# Patient Record
Sex: FEMALE | Race: WHITE | NOT HISPANIC OR LATINO | Employment: FULL TIME | URBAN - METROPOLITAN AREA
[De-identification: names, ages, dates, MRNs, and addresses within clinical notes are randomized per-mention and may not be internally consistent; named-entity substitution may affect disease eponyms.]

---

## 2018-01-16 NOTE — PROGRESS NOTES
Assessment    1  Anxiety disorder (300 00) (F41 9)    Plan  Allergic rhinitis    · Benzonatate 100 MG Oral Capsule  Anxiety disorder    · ALPRAZolam 0 25 MG Oral Tablet; take 1 tablet daily as needed  Conjunctivitis    · Polymyxin B-Trimethoprim 59221-4 1 UNIT/ML-% Ophthalmic Solution  Hand, foot and mouth disease    · Tylenol 325 MG Oral Tablet  PMH: Influenza vaccination administered at current visit    · Citalopram Hydrobromide 10 MG Oral Tablet; Take 1 tablet daily   · Follow-up visit in 1 month Evaluation and Treatment  Follow-up in March  Status: Hold  For - Scheduling  Requested for: 64BLH5774   · Fluzone Quadrivalent Intramuscular Suspension    History of Present Illness  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 [place]  This is a new poisition  She was treated with citalopram a few years ago and had good results  Active Problems    1  Acute viral pharyngitis (462) (J02 9)   2  Adjustment disorder with anxiety (309 24) (F43 22)   3  Allergic rhinitis (477 9) (J30 9)   4  Allergic rhinitis due to pollen (477 0) (J30 1)   5  Anxiety disorder (300 00) (F41 9)   6  Colon cancer screening (V76 51) (Z12 11)   7  Conjunctivitis (372 30) (H10 9)   8  Dysphagia (787 20) (R13 10)   9  Encounter for routine gynecological examination with Papanicolaou smear of cervix   (V72 31,V76 2) (Z01 419,Z12 4)   10  Hand, foot and mouth disease (074 3) (B08 4)   11  Screening for HPV (human papillomavirus) (V73 81) (Z11 51)   12  Toe laceration (893 0) (S91 119A)   13  Ulcerative colitis without complications (345 7) (E43 93)   14  Vertigo (780 4) (R42)    Past Medical History    1  History of Denial Of Any Significant Medical History   2  History of headache (V13 89) (Z87 898)   3  History of Influenza vaccination administered at current visit (V04 81) (Z23)   4  History of Irritable bowel syndrome (564 1) (K58 9)   5  History of Rheumatoid arthritis (714 0) (M06 9)   6   History of Urinary tract infection (599 0) (N39 0)    Surgical History    1  History of  Section   2  History of  Section   3  History of Removal Of Lesion    Family History    1  Family history of Anemia   2  Family history of Diabetes   3  Family history of Diabetes Mellitus (V18 0)   4  Family history of Family Health Status Of Mother - Alive   5  Family history of Prostate cancer   6  Family history of Skin cancer    7  Family history of Family Health Status Of Father - Alive   8  Family history of Prostate cancer   9  Family history of Prostate Cancer (V16 42)   10  Family history of Skin cancer    11  Family history of High blood pressure    12  Family history of Anemia   13  Family history of Diabetes   14  Family history of Peptic ulcer   15  Family history of Prostate cancer   16  Family history of Skin cancer   16  Family history of UTI (urinary tract infection)    25  Family history of Arthritis (V17 7)   19  Family history of Breast Cancer (V16 3)   20  Family history of Headache   21  Family history of Heart Disease (V17 49)   22  Family history of Irritable Bowel Syndrome   23  Family history of Lung Cancer (V16 1)   24  Family history of Osteoporosis (V17 81)    Social History    · Being A Social Drinker   · Caffeine Use   · Daily Coffee Consumption (___ Cups/Day)   · Exercise Frequency (Times/Week)   · Never a smoker   · History of Never a smoker   · Sexually Active   · Denied: History of Uses drugs daily    Current Meds   1  ALPRAZolam 0 25 MG Oral Tablet; take 1 tablet daily as needed; Therapy: 13QSR8505 to (Last Rx:23Nml6530) Ordered   2  Benzonatate 100 MG Oral Capsule; take 1 casule every 6- 8 hours during daytime for   cough; Therapy: 26KOA2021 to (Last Rx:2015)  Requested for: 2015 Ordered   3  Biotin TABS; Therapy: (Recorded:2015) to Recorded   4  Lialda 1 2 GM Oral Tablet Delayed Release; 2 tablets daily in morning Recorded   5  Multivitamins Oral Capsule Recorded   6   Polymyxin B-Trimethoprim 35655-6 1 UNIT/ML-% Ophthalmic Solution; INSTILL 1 DROP   IN BOTH EYES EVERY 3 HOURS DAILY; Therapy: 90SMC7694 to (Kathleen Salk)  Requested for: 38SXL4417; Last   Rx:22Nov2015 Ordered   7  Tylenol 325 MG Oral Tablet; TAKE 1 TO 2 TABLETS EVERY 6 HOURS AS NEEDED; Therapy: 54DLD0450 to (Last Rx:97Yde3384)  Requested for: 99Fnc1988 Ordered    Allergies    1  No Known Drug Allergies    Vitals  Vital Signs [Data Includes: Current Encounter]    Recorded: 18HHD2406 03:14PM   Heart Rate 80   Respiration 18   Systolic 780   Diastolic 72   Height 5 ft 7 in   Weight 128 lb 4 00 oz   BMI Calculated 20 09   BSA Calculated 1 67     Physical Exam    Constitutional   General appearance: No acute distress, well appearing and well nourished           Results/Data  Encounter Results   (1) THIN PREP PAP WITH IMAGING 47SCO9184 12:00AM Earmon Kil     Test Name Result Flag Reference   THIN PREP PAP W  IMAG COMMENT see attached       Summary / No summary entered :      No summary entered  Documents attached :      sPap Tests - [facility]; Enc: [id number] - Appointment - Estevan Hampton -      (Obstetrics/Gynecology) (Additional Information Document)    Signatures   Electronically signed by : MICHELLE Live ; Feb 2 2016  3:47PM EST                       (Author)

## 2018-01-30 ENCOUNTER — OFFICE VISIT (OUTPATIENT)
Dept: OBGYN CLINIC | Facility: MEDICAL CENTER | Age: 43
End: 2018-01-30
Payer: COMMERCIAL

## 2018-01-30 VITALS
DIASTOLIC BLOOD PRESSURE: 72 MMHG | WEIGHT: 138 LBS | HEIGHT: 67 IN | BODY MASS INDEX: 21.66 KG/M2 | SYSTOLIC BLOOD PRESSURE: 128 MMHG

## 2018-01-30 DIAGNOSIS — Z01.419 ENCOUNTER FOR GYNECOLOGICAL EXAMINATION (GENERAL) (ROUTINE) WITHOUT ABNORMAL FINDINGS: Primary | ICD-10-CM

## 2018-01-30 DIAGNOSIS — Z98.890 HISTORY OF RIGHT BREAST BIOPSY: ICD-10-CM

## 2018-01-30 DIAGNOSIS — Z11.51 SCREENING FOR HPV (HUMAN PAPILLOMAVIRUS): ICD-10-CM

## 2018-01-30 PROCEDURE — 87624 HPV HI-RISK TYP POOLED RSLT: CPT | Performed by: PHYSICIAN ASSISTANT

## 2018-01-30 PROCEDURE — S0612 ANNUAL GYNECOLOGICAL EXAMINA: HCPCS | Performed by: PHYSICIAN ASSISTANT

## 2018-01-30 PROCEDURE — G0145 SCR C/V CYTO,THINLAYER,RESCR: HCPCS | Performed by: PHYSICIAN ASSISTANT

## 2018-01-30 RX ORDER — MESALAMINE 1.2 G/1
TABLET, DELAYED RELEASE ORAL
COMMUNITY
End: 2020-11-06

## 2018-01-30 RX ORDER — ALPRAZOLAM 0.25 MG/1
TABLET ORAL
COMMUNITY
End: 2020-11-06

## 2018-01-30 NOTE — PROGRESS NOTES
Assessment/Plan  Problem List Items Addressed This Visit     Encounter for gynecological examination (general) (routine) without abnormal findings - Primary     Annual exam and pap smear with HPV performed, will call if abnormal   Per patient she is due for 6 month mammo 2018 on R, will get prior reports from [place] sent here, rx given to patient  Patient wishes to try to conceive, discussed AMA and increased risks, recommend starting PNV now and if wants to conceive not to delay decision to start trying  RTO 1 year for next annual          Relevant Orders    Liquid-based pap, screening      Other Visit Diagnoses     Screening for HPV (human papillomavirus)        Relevant Orders    Liquid-based pap, screening    History of right breast biopsy        Relevant Orders    Mammo diagnostic right w 3d & cad          Subjective      Nicole Richards is a 40 y o  female who presents for a new patient annual GYN exam  Periods are regular every 28-30 days, Dysmenorrhea:mild, occurring premenstrually  She denies intermenstrual bleeding, spotting, or discharge  She reports that she is sexually active with 1 partner and using condoms for contraception  Last Pap smear: 2015  Regular self breast exam: no  Last mammogram: 2017  Family history of uterine or ovarian cancer: no  Family history of breast cancer: no  Family history of colon cancer: no    Menstrual History:  OB History      Para Term  AB Living    2 0 0 0 0 2    SAB TAB Ectopic Multiple Live Births    0 0 0 0 2        Patient's last menstrual period was 2017 (exact date)  The following portions of the patient's history were reviewed and updated as appropriate: allergies, current medications, past family history, past medical history, past social history, past surgical history and problem list     Review of Systems  Review of Systems   Constitutional: Negative for chills and fever  Respiratory: Negative for shortness of breath  Cardiovascular: Negative for chest pain  Gastrointestinal: Negative for abdominal pain  Genitourinary: Negative for dysuria, pelvic pain, vaginal discharge and vaginal pain  Negative for Breast Pain and Breast Lump  Negative for Urinary Incontinence  Neurological: Negative for headaches  Objective      /72 (BP Location: Right arm, Patient Position: Sitting, Cuff Size: Standard)   Ht 5' 7" (1 702 m)   Wt 62 6 kg (138 lb)   LMP 12/28/2017 (Exact Date)   BMI 21 61 kg/m²     Physical Exam   Constitutional: She is oriented to person, place, and time  She appears well-developed and well-nourished  Neck: No thyromegaly present  Cardiovascular: Normal rate and regular rhythm  Pulmonary/Chest: Effort normal and breath sounds normal  Right breast exhibits no mass, no nipple discharge, no skin change and no tenderness  Left breast exhibits no mass, no nipple discharge, no skin change and no tenderness  Abdominal: Soft  There is no tenderness  There is no rebound and no guarding  Genitourinary: There is no rash or lesion on the right labia  There is no rash or lesion on the left labia  Uterus is not enlarged and not tender  Cervix exhibits no motion tenderness and no discharge  Right adnexum displays no mass, no tenderness and no fullness  Left adnexum displays no mass and no tenderness  No bleeding in the vagina  No vaginal discharge found  Genitourinary Comments: Pap performed   Neurological: She is alert and oriented to person, place, and time  Psychiatric: She has a normal mood and affect

## 2018-01-30 NOTE — ASSESSMENT & PLAN NOTE
Annual exam and pap smear with HPV performed, will call if abnormal   Per patient she is due for 6 month mammo 5/2018 on R, will get prior reports from [place] sent here, rx given to patient  Patient wishes to try to conceive, discussed AMA and increased risks, recommend starting PNV now and if wants to conceive not to delay decision to start trying    RTO 1 year for next annual

## 2018-02-06 LAB — HPV RRNA GENITAL QL NAA+PROBE: NORMAL

## 2018-02-07 ENCOUNTER — OFFICE VISIT (OUTPATIENT)
Dept: URGENT CARE | Facility: MEDICAL CENTER | Age: 43
End: 2018-02-07
Payer: COMMERCIAL

## 2018-02-07 VITALS
SYSTOLIC BLOOD PRESSURE: 114 MMHG | RESPIRATION RATE: 18 BRPM | HEART RATE: 104 BPM | BODY MASS INDEX: 21.5 KG/M2 | OXYGEN SATURATION: 97 % | HEIGHT: 67 IN | DIASTOLIC BLOOD PRESSURE: 70 MMHG | TEMPERATURE: 100.9 F | WEIGHT: 137 LBS

## 2018-02-07 DIAGNOSIS — R11.0 NAUSEA: ICD-10-CM

## 2018-02-07 DIAGNOSIS — A08.4 VIRAL GASTROENTERITIS: Primary | ICD-10-CM

## 2018-02-07 LAB
LAB AP GYN PRIMARY INTERPRETATION: NORMAL
LAB AP LMP: NORMAL
Lab: NORMAL

## 2018-02-07 PROCEDURE — G0382 LEV 3 HOSP TYPE B ED VISIT: HCPCS | Performed by: PHYSICIAN ASSISTANT

## 2018-02-07 PROCEDURE — 99283 EMERGENCY DEPT VISIT LOW MDM: CPT | Performed by: PHYSICIAN ASSISTANT

## 2018-02-07 PROCEDURE — S9083 URGENT CARE CENTER GLOBAL: HCPCS | Performed by: PHYSICIAN ASSISTANT

## 2018-02-07 RX ORDER — ONDANSETRON 4 MG/1
4 TABLET, FILM COATED ORAL EVERY 8 HOURS PRN
Qty: 15 TABLET | Refills: 0 | Status: SHIPPED | OUTPATIENT
Start: 2018-02-07 | End: 2020-11-06

## 2018-02-07 RX ORDER — ONDANSETRON 4 MG/1
4 TABLET, ORALLY DISINTEGRATING ORAL ONCE
Status: COMPLETED | OUTPATIENT
Start: 2018-02-07 | End: 2018-02-07

## 2018-02-07 RX ADMIN — ONDANSETRON 4 MG: 4 TABLET, ORALLY DISINTEGRATING ORAL at 12:18

## 2018-02-07 NOTE — PATIENT INSTRUCTIONS
Viral gastroenteritis  Use Zofran as needed for nausea  Aggressive fluid replacement with half gatorade and half water while you are not eating  If diarrhea worsens and you are unable to keep up with fluid losses, you may take 1 imodium tab  Take a second tab 12 hours later if necessary  Try to avoid imodium use due to the risk of constipation  Follow up with your PCP in 3-5 days for persistent symptoms  Go to the ER for any distress or inability to keep up with fluid losses

## 2018-02-07 NOTE — LETTER
February 7, 2018     Patient: Aylin Aceves   YOB: 1975   Date of Visit: 2/7/2018       To Whom it May Concern:    Joshua Dubois was seen in my clinic on 2/7/2018  She may return to work on when no fever for 24 hours  If you have any questions or concerns, please don't hesitate to call           Sincerely,          [facility] [place]        CC: No Recipients

## 2018-02-07 NOTE — PROGRESS NOTES
[facility]        NAME: Tejal Lewis is a 40 y o  female  : 1975    MRN: [id number]  DATE: 2018  TIME: 12:22 PM    Assessment and Plan   Viral gastroenteritis [A08 4]  1  Viral gastroenteritis     2  Nausea  ondansetron (ZOFRAN-ODT) dispersible tablet 4 mg    ondansetron (ZOFRAN) 4 mg tablet         Patient Instructions   Viral gastroenteritis  Use Zofran as needed for nausea  Aggressive fluid replacement with half gatorade and half water while you are not eating  If diarrhea worsens and you are unable to keep up with fluid losses, you may take 1 imodium tab  Take a second tab 12 hours later if necessary  Try to avoid imodium use due to the risk of constipation  Follow up with PCP in 3-5 days  Proceed to  ER if symptoms worsen  Chief Complaint     Chief Complaint   Patient presents with    Abdominal Pain     Pt  C/O intermittent abd pain with nausea and diarrhea yesterday   Generalized Body Aches     fever, chills, and achiness         History of Present Illness   Tejal Lewis presents to the clinic c/o    This is a 40 year female past medical history of ulcerative colitis presenting for sudden onset nausea, watery diarrhea x1 day  She states that she is having migrating stabbing abdominal pains with the diarrhea  She has not had any diarrhea since last night  She states she is also having body aches, headache, anorexia  She has been using a heating pad over her stomach, which helps her abdominal pain  She is not eating any food and not drinking any fluids because she is nauseous  She is not taking any medications at home  Temperature of 100 9 in the office  Abdominal Pain   Associated symptoms include diarrhea, a fever and nausea  Pertinent negatives include no vomiting  Generalized Body Aches   Associated symptoms include fatigue, a fever, abdominal pain, diarrhea and nausea   Pertinent negatives include no chest pain, coughing, shortness of breath or vomiting  Review of Systems   Review of Systems   Constitutional: Positive for appetite change, fatigue and fever  Negative for chills and diaphoresis  HENT: Negative  Respiratory: Negative for cough and shortness of breath  Cardiovascular: Negative for chest pain and palpitations  Gastrointestinal: Positive for abdominal pain, diarrhea and nausea  Negative for vomiting  Current Medications     Long-Term Prescriptions   Medication Sig Dispense Refill    ALPRAZolam (XANAX) 0 25 mg tablet Take by mouth daily at bedtime as needed      mesalamine (LIALDA) 1 2 g EC tablet Take by mouth      ondansetron (ZOFRAN) 4 mg tablet Take 1 tablet (4 mg total) by mouth every 8 (eight) hours as needed for nausea or vomiting 15 tablet 0       Current Allergies     Allergies as of 02/07/2018    (No Known Allergies)            The following portions of the patient's history were reviewed and updated as appropriate: allergies, current medications, past family history, past medical history, past social history, past surgical history and problem list     Objective   /70   Pulse 104   Temp (!) 100 9 °F (38 3 °C) (Temporal)   Resp 18   Ht 5' 7" (1 702 m)   Wt 62 1 kg (137 lb)   SpO2 97%   BMI 21 46 kg/m²        Physical Exam     Physical Exam   Constitutional: She appears well-developed and well-nourished  HENT:   Head: Normocephalic and atraumatic  Right Ear: External ear normal  No drainage or tenderness  Left Ear: External ear normal  No drainage or tenderness  Nose: Nose normal    Mouth/Throat: Oropharynx is clear and moist  No oropharyngeal exudate  Eyes: Conjunctivae and EOM are normal  Pupils are equal, round, and reactive to light  Neck: Normal range of motion  Neck supple  Cardiovascular: Normal rate, regular rhythm and normal heart sounds  Pulmonary/Chest: Effort normal and breath sounds normal  No respiratory distress  She has no wheezes  She has no rhonchi   She has no rales    Abdominal: Soft  Bowel sounds are normal  She exhibits no distension and no mass  There is no tenderness  There is no rebound, no guarding, no CVA tenderness, no tenderness at McBurney's point and negative Marcus's sign  Negative Marcus sign, negative McBurney's point, no guarding, no peritoneal signs  Lymphadenopathy:     She has no cervical adenopathy  Neurological: She is alert  Skin: Skin is warm and dry

## 2018-04-02 ENCOUNTER — OFFICE VISIT (OUTPATIENT)
Dept: FAMILY MEDICINE CLINIC | Facility: MEDICAL CENTER | Age: 43
End: 2018-04-02
Payer: COMMERCIAL

## 2018-04-02 VITALS
BODY MASS INDEX: 22.02 KG/M2 | SYSTOLIC BLOOD PRESSURE: 112 MMHG | WEIGHT: 140.6 LBS | DIASTOLIC BLOOD PRESSURE: 68 MMHG | HEART RATE: 68 BPM | RESPIRATION RATE: 16 BRPM

## 2018-04-02 DIAGNOSIS — J30.1 CHRONIC SEASONAL ALLERGIC RHINITIS DUE TO POLLEN: ICD-10-CM

## 2018-04-02 DIAGNOSIS — F43.22 ADJUSTMENT DISORDER WITH ANXIETY: Primary | ICD-10-CM

## 2018-04-02 PROCEDURE — 99213 OFFICE O/P EST LOW 20 MIN: CPT | Performed by: FAMILY MEDICINE

## 2018-04-02 RX ORDER — FLUTICASONE PROPIONATE 50 MCG
2 SPRAY, SUSPENSION (ML) NASAL DAILY
Qty: 1 BOTTLE | Refills: 1 | Status: SHIPPED | OUTPATIENT
Start: 2018-04-02 | End: 2020-11-06

## 2018-04-12 NOTE — PROGRESS NOTES
Patient is here for routine follow-up of her generalized anxiety  She takes Trentellix  This was prescribed by another physician that she was seeing between her last visit with me in today  It works well for her  She has very little side effects  She also occasionally uses Xanax  Mostly at HS to help her sleep  Otherwise review of systems is negative  She overall feels well  The only exception to this is seasonal rhinitis which is starting to act up  She needs a new prescription for Flonase    /68 (BP Location: Left arm, Patient Position: Sitting, Cuff Size: Adult)   Pulse 68   Resp 16   Wt 63 8 kg (140 lb 9 6 oz)   BMI 22 02 kg/m²     ENT examination is normal neck is supple chest clear cardiac exam reveals regular rate and rhythm abdomen is soft and nontender    Continue medications  Will see again in a few months for routine follow-up

## 2019-02-03 ENCOUNTER — OFFICE VISIT (OUTPATIENT)
Dept: URGENT CARE | Facility: MEDICAL CENTER | Age: 44
End: 2019-02-03
Payer: COMMERCIAL

## 2019-02-03 VITALS
OXYGEN SATURATION: 100 % | TEMPERATURE: 98.8 F | DIASTOLIC BLOOD PRESSURE: 68 MMHG | SYSTOLIC BLOOD PRESSURE: 114 MMHG | RESPIRATION RATE: 18 BRPM | BODY MASS INDEX: 19.43 KG/M2 | WEIGHT: 123.8 LBS | HEART RATE: 88 BPM | HEIGHT: 67 IN

## 2019-02-03 DIAGNOSIS — R30.0 DYSURIA: Primary | ICD-10-CM

## 2019-02-03 LAB
SL AMB  POCT GLUCOSE, UA: ABNORMAL
SL AMB LEUKOCYTE ESTERASE,UA: ABNORMAL
SL AMB POCT BILIRUBIN,UA: ABNORMAL
SL AMB POCT BLOOD,UA: ABNORMAL
SL AMB POCT CLARITY,UA: CLEAR
SL AMB POCT COLOR,UA: ABNORMAL
SL AMB POCT KETONES,UA: ABNORMAL
SL AMB POCT NITRITE,UA: ABNORMAL
SL AMB POCT PH,UA: 8
SL AMB POCT SPECIFIC GRAVITY,UA: 1
SL AMB POCT URINE PROTEIN: ABNORMAL
SL AMB POCT UROBILINOGEN: 0.2

## 2019-02-03 PROCEDURE — 87086 URINE CULTURE/COLONY COUNT: CPT | Performed by: PHYSICIAN ASSISTANT

## 2019-02-03 PROCEDURE — 87186 SC STD MICRODIL/AGAR DIL: CPT | Performed by: PHYSICIAN ASSISTANT

## 2019-02-03 PROCEDURE — 87077 CULTURE AEROBIC IDENTIFY: CPT | Performed by: PHYSICIAN ASSISTANT

## 2019-02-03 PROCEDURE — 99213 OFFICE O/P EST LOW 20 MIN: CPT | Performed by: PHYSICIAN ASSISTANT

## 2019-02-03 RX ORDER — SULFAMETHOXAZOLE AND TRIMETHOPRIM 800; 160 MG/1; MG/1
1 TABLET ORAL EVERY 12 HOURS SCHEDULED
Qty: 14 TABLET | Refills: 0 | Status: SHIPPED | OUTPATIENT
Start: 2019-02-03 | End: 2019-02-10

## 2019-02-03 NOTE — PATIENT INSTRUCTIONS
1  Take Bactrim 1 tablet twice daily x 7 days  2  Increase fluids  3   Follow up with PCP in 3-5 days if symptoms persist

## 2019-02-03 NOTE — PROGRESS NOTES
[facility]        NAME: Mitch Brown is a 44 y o  female  : 1975    MRN: [id number]  DATE: February 3, 2019  TIME: 10:12 AM    Assessment and Plan   Dysuria [R30 0]  1  Dysuria  POCT urine dip    Urine culture    sulfamethoxazole-trimethoprim (BACTRIM DS) 800-160 mg per tablet         Patient Instructions     1  Take Bactrim 1 tablet twice daily x 7 days  2  Increase fluids  3  Follow up with PCP in 3-5 days if symptoms persist       Chief Complaint     Chief Complaint   Patient presents with    Possible UTI     Pt  C/O dysuria for the past couple days  Pt  also reports a rash for the past two weeks   Rash         History of Present Illness       The patient is a 81-year-old female presents with a 2 day history of urinary frequency, burning and painful urination  She denies any fever but has had lower abdominal pressure since the onset of her symptoms  Patient denies any visible blood in her urine  Review of Systems   Review of Systems   Constitutional: Negative  Gastrointestinal: Negative  Genitourinary: Positive for dysuria and frequency  Negative for hematuria           Current Medications       Current Outpatient Prescriptions:     ALPRAZolam (XANAX) 0 25 mg tablet, Take by mouth daily at bedtime as needed, Disp: , Rfl:     Multiple Vitamins-Minerals (MULTIVITAMIN ADULT PO), Take by mouth, Disp: , Rfl:     fluticasone (FLONASE) 50 mcg/act nasal spray, 2 sprays into each nostril daily (Patient not taking: Reported on 2/3/2019 ), Disp: 1 Bottle, Rfl: 1    mesalamine (LIALDA) 1 2 g EC tablet, Take by mouth, Disp: , Rfl:     ondansetron (ZOFRAN) 4 mg tablet, Take 1 tablet (4 mg total) by mouth every 8 (eight) hours as needed for nausea or vomiting (Patient not taking: Reported on 2/3/2019 ), Disp: 15 tablet, Rfl: 0    sulfamethoxazole-trimethoprim (BACTRIM DS) 800-160 mg per tablet, Take 1 tablet by mouth every 12 (twelve) hours for 7 days, Disp: 14 tablet, Rfl: 0   Vortioxetine HBr (TRINTELLIX) 10 MG TABS, Take 1 tablet (10 mg total) by mouth daily (Patient not taking: Reported on 2/3/2019 ), Disp: 30 tablet, Rfl: 2    Current Allergies     Allergies as of 2019    (No Known Allergies)            The following portions of the patient's history were reviewed and updated as appropriate: allergies, current medications, past family history, past medical history, past social history, past surgical history and problem list      Past Medical History:   Diagnosis Date    Anxiety and depression     IBS (irritable bowel syndrome)     Rheumatoid arthritis ([facility] [address] )     Ulcerative colitis ([facility] )        Past Surgical History:   Procedure Laterality Date     SECTION      x2    OTHER SURGICAL HISTORY      Removal of Lesion -       Family History   Problem Relation Age of Onset    Anemia Mother     Diabetes Mother     Skin cancer Mother     Prostate cancer Father     Skin cancer Father     Anemia Maternal Grandmother     Diabetes Maternal Grandmother     Ulcers Maternal Grandmother     Skin cancer Maternal Grandmother     Urinary tract infection Maternal Grandmother     Arthritis Family     Breast cancer Family     Migraines Family     Heart disease Family     Irritable bowel syndrome Family     Lung cancer Family     Osteoporosis Family          Medications have been verified  Objective   /68   Pulse 88   Temp 98 8 °F (37 1 °C) (Temporal)   Resp 18   Ht 5' 7" (1 702 m)   Wt 56 2 kg (123 lb 12 8 oz)   SpO2 100%   BMI 19 39 kg/m²        Physical Exam     Physical Exam   Constitutional: She appears well-developed and well-nourished  No distress  Cardiovascular: Normal rate, regular rhythm and normal heart sounds  No murmur heard  Pulmonary/Chest: Effort normal and breath sounds normal    Abdominal: Soft  Bowel sounds are normal  There is tenderness in the suprapubic area   There is no rigidity, no rebound, no guarding and no CVA tenderness

## 2019-02-05 ENCOUNTER — TELEPHONE (OUTPATIENT)
Dept: OBGYN CLINIC | Facility: MEDICAL CENTER | Age: 44
End: 2019-02-05

## 2019-02-05 LAB — BACTERIA UR CULT: ABNORMAL

## 2019-02-05 NOTE — TELEPHONE ENCOUNTER
Spoke with pt - she went to a [facility] urgent care on 02/03 for a UTI - was given Bactrim DS  She is on day 2 of medication, but still having frequent urination and abdominal pressure  Advised pt to complete the medication and push fluids  If still feeling sx after completing the RX to call back

## 2019-08-31 ENCOUNTER — OFFICE VISIT (OUTPATIENT)
Dept: URGENT CARE | Facility: MEDICAL CENTER | Age: 44
End: 2019-08-31
Payer: COMMERCIAL

## 2019-08-31 VITALS
BODY MASS INDEX: 19.06 KG/M2 | SYSTOLIC BLOOD PRESSURE: 122 MMHG | RESPIRATION RATE: 18 BRPM | DIASTOLIC BLOOD PRESSURE: 62 MMHG | WEIGHT: 121.4 LBS | HEART RATE: 68 BPM | HEIGHT: 67 IN | OXYGEN SATURATION: 100 % | TEMPERATURE: 98.4 F

## 2019-08-31 DIAGNOSIS — R30.0 DYSURIA: Primary | ICD-10-CM

## 2019-08-31 LAB
SL AMB  POCT GLUCOSE, UA: ABNORMAL
SL AMB LEUKOCYTE ESTERASE,UA: ABNORMAL
SL AMB POCT BILIRUBIN,UA: ABNORMAL
SL AMB POCT BLOOD,UA: ABNORMAL
SL AMB POCT CLARITY,UA: ABNORMAL
SL AMB POCT COLOR,UA: ABNORMAL
SL AMB POCT KETONES,UA: ABNORMAL
SL AMB POCT NITRITE,UA: ABNORMAL
SL AMB POCT PH,UA: 6
SL AMB POCT SPECIFIC GRAVITY,UA: 1.01
SL AMB POCT URINE PROTEIN: ABNORMAL
SL AMB POCT UROBILINOGEN: 0.2

## 2019-08-31 PROCEDURE — 87077 CULTURE AEROBIC IDENTIFY: CPT | Performed by: PHYSICIAN ASSISTANT

## 2019-08-31 PROCEDURE — 81002 URINALYSIS NONAUTO W/O SCOPE: CPT | Performed by: PHYSICIAN ASSISTANT

## 2019-08-31 PROCEDURE — 87186 SC STD MICRODIL/AGAR DIL: CPT | Performed by: PHYSICIAN ASSISTANT

## 2019-08-31 PROCEDURE — 99213 OFFICE O/P EST LOW 20 MIN: CPT | Performed by: PHYSICIAN ASSISTANT

## 2019-08-31 PROCEDURE — 87086 URINE CULTURE/COLONY COUNT: CPT | Performed by: PHYSICIAN ASSISTANT

## 2019-08-31 RX ORDER — SULFAMETHOXAZOLE AND TRIMETHOPRIM 800; 160 MG/1; MG/1
1 TABLET ORAL EVERY 12 HOURS SCHEDULED
Qty: 14 TABLET | Refills: 0 | Status: SHIPPED | OUTPATIENT
Start: 2019-08-31 | End: 2019-09-07

## 2019-08-31 NOTE — PROGRESS NOTES
[facility]        NAME: Roberto Welsh is a 44 y o  female  : 1975    MRN: [id number]  DATE: 2019  TIME: 11:42 AM    Assessment and Plan   Dysuria [R30 0]  1  Dysuria  POCT urine dip    Urine culture    sulfamethoxazole-trimethoprim (BACTRIM DS) 800-160 mg per tablet         Patient Instructions     1  Increase fluids  2  Take Bactrim DS 1 tablet twice daily x 7 days  3  Follow up with PCP in 3-5 days if symptoms persist       Chief Complaint     Chief Complaint   Patient presents with    Possible UTI     Pt  C/O frequency,  urgency, and blood in the urine since yesterday  History of Present Illness       BODØ is a 78-year-old female presents with a 1 day history of acute onset urinary frequency, pain and burning with urination  Patient denies any fever, abdominal or back pain since the onset of her symptoms but has noted visible blood over the past 24 hours  Review of Systems   Review of Systems   Constitutional: Negative  Gastrointestinal: Negative  Genitourinary: Positive for dysuria, frequency and hematuria  Negative for flank pain           Current Medications       Current Outpatient Medications:     ALPRAZolam (XANAX) 0 25 mg tablet, Take by mouth daily at bedtime as needed, Disp: , Rfl:     Multiple Vitamins-Minerals (MULTIVITAMIN ADULT PO), Take by mouth, Disp: , Rfl:     Probiotic Product (PROBIOTIC PO), Take by mouth, Disp: , Rfl:     fluticasone (FLONASE) 50 mcg/act nasal spray, 2 sprays into each nostril daily (Patient not taking: Reported on 2/3/2019 ), Disp: 1 Bottle, Rfl: 1    mesalamine (LIALDA) 1 2 g EC tablet, Take by mouth, Disp: , Rfl:     ondansetron (ZOFRAN) 4 mg tablet, Take 1 tablet (4 mg total) by mouth every 8 (eight) hours as needed for nausea or vomiting (Patient not taking: Reported on 2/3/2019 ), Disp: 15 tablet, Rfl: 0    sulfamethoxazole-trimethoprim (BACTRIM DS) 800-160 mg per tablet, Take 1 tablet by mouth every 12 (twelve) hours for 7 days, Disp: 14 tablet, Rfl: 0    Vortioxetine HBr (TRINTELLIX) 10 MG TABS, Take 1 tablet (10 mg total) by mouth daily (Patient not taking: Reported on 2/3/2019 ), Disp: 30 tablet, Rfl: 2    Current Allergies     Allergies as of 2019    (No Known Allergies)            The following portions of the patient's history were reviewed and updated as appropriate: allergies, current medications, past family history, past medical history, past social history, past surgical history and problem list      Past Medical History:   Diagnosis Date    Anxiety and depression     IBS (irritable bowel syndrome)     [facility]oid [facility] [address]     Ulcerative colitis ([facility] [address] )        Past Surgical History:   Procedure Laterality Date     SECTION      x2    OTHER SURGICAL HISTORY      Removal of Lesion -       Family History   Problem Relation Age of Onset    Anemia Mother     Diabetes Mother     Skin cancer Mother     Prostate cancer Father     Skin cancer Father     Anemia Maternal Grandmother     Diabetes Maternal Grandmother     Ulcers Maternal Grandmother     Skin cancer Maternal Grandmother     Urinary tract infection Maternal Grandmother     Arthritis Family     Breast cancer Family     Migraines Family     Heart disease Family     Irritable bowel syndrome Family     Lung cancer Family     Osteoporosis Family          Medications have been verified  Objective   /62   Pulse 68   Temp 98 4 °F (36 9 °C) (Temporal)   Resp 18   Ht 5' 7" (1 702 m)   Wt 55 1 kg (121 lb 6 4 oz)   SpO2 100%   BMI 19 01 kg/m²        Physical Exam     Physical Exam   Constitutional: She appears well-developed and well-nourished  No distress  Cardiovascular: Normal rate, regular rhythm and normal heart sounds  No murmur heard  Pulmonary/Chest: Effort normal and breath sounds normal    Abdominal: Soft  Normal appearance and bowel sounds are normal  She exhibits no distension  There is tenderness in the suprapubic area  There is no rigidity, no rebound, no guarding and no CVA tenderness

## 2019-08-31 NOTE — PATIENT INSTRUCTIONS
1  Increase fluids  2  Take Bactrim DS 1 tablet twice daily x 7 days  3   Follow up with PCP in 3-5 days if symptoms persist

## 2019-09-02 LAB
BACTERIA UR CULT: ABNORMAL
BACTERIA UR CULT: ABNORMAL

## 2020-01-29 ENCOUNTER — OFFICE VISIT (OUTPATIENT)
Dept: URGENT CARE | Facility: CLINIC | Age: 45
End: 2020-01-29
Payer: COMMERCIAL

## 2020-01-29 VITALS
DIASTOLIC BLOOD PRESSURE: 60 MMHG | SYSTOLIC BLOOD PRESSURE: 105 MMHG | OXYGEN SATURATION: 98 % | BODY MASS INDEX: 19.93 KG/M2 | WEIGHT: 127 LBS | HEART RATE: 70 BPM | RESPIRATION RATE: 18 BRPM | TEMPERATURE: 96.9 F | HEIGHT: 67 IN

## 2020-01-29 DIAGNOSIS — R04.0 EPISTAXIS: Primary | ICD-10-CM

## 2020-01-29 PROCEDURE — 3008F BODY MASS INDEX DOCD: CPT | Performed by: NURSE PRACTITIONER

## 2020-01-29 PROCEDURE — 99213 OFFICE O/P EST LOW 20 MIN: CPT | Performed by: NURSE PRACTITIONER

## 2020-01-30 NOTE — PROGRESS NOTES
[facility]        NAME: Ivon Muller is a 38 y o  female  : 1975    MRN: [id number]  DATE: 2020  TIME: 7:45 PM    Assessment and Plan   Epistaxis [R04 0]  1  Epistaxis           Patient Instructions     3 times per day soften the nares by gentle inhaling warm steamy water  Gentle apply bacitracin ointment or petroleum jelly and gentle massage the nare  DO NOT pick or use any hard device in th nose  Begin to use humidifier at bed time, begin running the humidifier 2 hours before bed time    If symptoms persist or worsen follow up with an ear, nose, an throat MD   Go to the ER if symptoms worsen or bleeding becomes uncontrolled  Follow up with PCP in 3-5 days  Proceed to  ER if symptoms worsen  Chief Complaint     Chief Complaint   Patient presents with    Nasal Congestion     had for 2 weeks  L inner throbs  R nostril clogged and feels hard w bleeding when trying to clean out "crustiness"  History of Present Illness       37 y/o female presents with right nasal pain with intermittent nose bleeds  She states that 2 weeks ago she had a cold, which had resolved  Since then she has dryness to the right nostril with intermittent bleeding  She now c/o pain in the nostril with a constant "scabbing" feeling  She states that when she feels the dry scabbing feeling she take a q-tip with bacitracin on the end and tries to pull the scab off which results in the nose bleeding  The house is heated with a pellet stove  She did have a humidifier in her room, but at the times the symptoms began the humidifier broke  She denies any foul odors in the nose, there is no purulent drainage, N/V, or fevers  Review of Systems   Review of Systems   Constitutional: Negative for chills and fever  HENT: Positive for nosebleeds  Negative for postnasal drip, rhinorrhea, sinus pressure and sinus pain           Positive for nasal sore   Respiratory: Negative for cough and shortness of breath  Cardiovascular: Negative for chest pain and palpitations  Gastrointestinal: Negative for nausea and vomiting  Skin: Negative for pallor           Current Medications       Current Outpatient Medications:     Multiple Vitamins-Minerals (MULTIVITAMIN ADULT PO), Take by mouth, Disp: , Rfl:     Probiotic Product (PROBIOTIC PO), Take by mouth, Disp: , Rfl:     ALPRAZolam (XANAX) 0 25 mg tablet, Take by mouth daily at bedtime as needed, Disp: , Rfl:     fluticasone (FLONASE) 50 mcg/act nasal spray, 2 sprays into each nostril daily (Patient not taking: Reported on 2/3/2019 ), Disp: 1 Bottle, Rfl: 1    mesalamine (LIALDA) 1 2 g EC tablet, Take by mouth, Disp: , Rfl:     ondansetron (ZOFRAN) 4 mg tablet, Take 1 tablet (4 mg total) by mouth every 8 (eight) hours as needed for nausea or vomiting (Patient not taking: Reported on 2/3/2019 ), Disp: 15 tablet, Rfl: 0    Vortioxetine HBr (TRINTELLIX) 10 MG TABS, Take 1 tablet (10 mg total) by mouth daily (Patient not taking: Reported on 2/3/2019 ), Disp: 30 tablet, Rfl: 2    Current Allergies     Allergies as of 2020    (No Known Allergies)            The following portions of the patient's history were reviewed and updated as appropriate: allergies, current medications, past family history, past medical history, past social history, past surgical history and problem list      Past Medical History:   Diagnosis Date    Anxiety and depression     IBS (irritable bowel syndrome)     Rheumatoid arthritis ([facility] [address] )     Ulcerative colitis ([facility] [address] )        Past Surgical History:   Procedure Laterality Date    BREAST SURGERY Right     non cancerous mass removed with reconstuction     SECTION      x2    OTHER SURGICAL HISTORY      Removal of Lesion -       Family History   Problem Relation Age of Onset    Anemia Mother     Diabetes Mother     Skin cancer Mother     Prostate cancer Father     Skin cancer Father     Anemia Maternal Grandmother  Diabetes Maternal Grandmother     Ulcers Maternal Grandmother     Skin cancer Maternal Grandmother     Urinary tract infection Maternal Grandmother     Arthritis Family     Breast cancer Family     Migraines Family     Heart disease Family     Irritable bowel syndrome Family     Lung cancer Family     Osteoporosis Family          Medications have been verified  Objective   /60   Pulse 70   Temp (!) 96 9 °F (36 1 °C)   Resp 18   Ht 5' 7" (1 702 m)   Wt 57 6 kg (127 lb)   LMP 01/02/2020 (Approximate)   SpO2 98%   Breastfeeding No   BMI 19 89 kg/m²        Physical Exam     Physical Exam   Constitutional: She is oriented to person, place, and time  Vital signs are normal  She appears well-developed and well-nourished  No distress  HENT:   Nose: Mucosal edema and sinus tenderness present  No epistaxis  Internal inspection of the right nostril reveals a red boggy turbinate, there is marked redness to the lateral aspect of the nostril  There is no bloody or purulent drainage noted  Cardiovascular: Normal rate and regular rhythm  Pulmonary/Chest: Effort normal and breath sounds normal    Musculoskeletal: She exhibits edema  Neurological: She is alert and oriented to person, place, and time  She has normal strength  GCS eye subscore is 4  GCS verbal subscore is 5  GCS motor subscore is 6  Skin: Skin is warm and dry  Psychiatric: She has a normal mood and affect  Her speech is normal and behavior is normal    Nursing note and vitals reviewed

## 2020-01-30 NOTE — PATIENT INSTRUCTIONS
3 times per day soften the nares by gentle inhaling warm steamy water  Gentle apply bacitracin ointment or petroleum jelly and gentle massage the nare  DO NOT pick or use any hard device in th nose  Begin to use humidifier at bed time, begin running the humidifier 2 hours before bed time    If symptoms persist or worsen follow up with an ear, nose, an throat MD   Go to the ER if symptoms worsen or bleeding becomes uncontrolled

## 2020-08-17 ENCOUNTER — TELEPHONE (OUTPATIENT)
Dept: FAMILY MEDICINE CLINIC | Facility: CLINIC | Age: 45
End: 2020-08-17

## 2020-08-17 NOTE — TELEPHONE ENCOUNTER
Patients last office visit (3/9/20) with Dr Corinne Bennett they discussed birth control  Patient would like to go the oral route for that and she stated she doesn't want a pill  that causes weight gain or acne        Her pharmacy is [facility]  Patient ph # [phone]

## 2020-11-06 ENCOUNTER — OFFICE VISIT (OUTPATIENT)
Dept: URGENT CARE | Facility: MEDICAL CENTER | Age: 45
End: 2020-11-06
Payer: COMMERCIAL

## 2020-11-06 VITALS — WEIGHT: 128 LBS | RESPIRATION RATE: 16 BRPM | BODY MASS INDEX: 20.09 KG/M2 | HEIGHT: 67 IN

## 2020-11-06 DIAGNOSIS — Z20.828 SARS-ASSOCIATED CORONAVIRUS EXPOSURE: ICD-10-CM

## 2020-11-06 DIAGNOSIS — J02.9 SORE THROAT: Primary | ICD-10-CM

## 2020-11-06 PROCEDURE — 99213 OFFICE O/P EST LOW 20 MIN: CPT | Performed by: PHYSICIAN ASSISTANT

## 2020-11-06 PROCEDURE — U0003 INFECTIOUS AGENT DETECTION BY NUCLEIC ACID (DNA OR RNA); SEVERE ACUTE RESPIRATORY SYNDROME CORONAVIRUS 2 (SARS-COV-2) (CORONAVIRUS DISEASE [COVID-19]), AMPLIFIED PROBE TECHNIQUE, MAKING USE OF HIGH THROUGHPUT TECHNOLOGIES AS DESCRIBED BY CMS-2020-01-R: HCPCS | Performed by: PHYSICIAN ASSISTANT

## 2020-11-08 LAB — SARS-COV-2 RNA SPEC QL NAA+PROBE: NOT DETECTED

## 2020-11-09 ENCOUNTER — TELEMEDICINE (OUTPATIENT)
Dept: OBGYN CLINIC | Facility: MEDICAL CENTER | Age: 45
End: 2020-11-09
Payer: COMMERCIAL

## 2020-11-09 VITALS — WEIGHT: 128 LBS | BODY MASS INDEX: 20.05 KG/M2

## 2020-11-09 DIAGNOSIS — Z30.011 ENCOUNTER FOR INITIAL PRESCRIPTION OF CONTRACEPTIVE PILLS: Primary | ICD-10-CM

## 2020-11-09 PROCEDURE — 99213 OFFICE O/P EST LOW 20 MIN: CPT | Performed by: STUDENT IN AN ORGANIZED HEALTH CARE EDUCATION/TRAINING PROGRAM

## 2020-11-09 RX ORDER — NORETHINDRONE ACETATE AND ETHINYL ESTRADIOL 1MG-20(21)
1 KIT ORAL DAILY
Qty: 28 TABLET | Refills: 5 | Status: SHIPPED | OUTPATIENT
Start: 2020-11-09 | End: 2021-04-15 | Stop reason: ALTCHOICE

## 2020-11-10 ENCOUNTER — TELEPHONE (OUTPATIENT)
Dept: OBGYN CLINIC | Facility: CLINIC | Age: 45
End: 2020-11-10

## 2020-11-11 ENCOUNTER — TELEPHONE (OUTPATIENT)
Dept: OBGYN CLINIC | Facility: CLINIC | Age: 45
End: 2020-11-11

## 2020-11-19 ENCOUNTER — TELEPHONE (OUTPATIENT)
Dept: OBGYN CLINIC | Facility: CLINIC | Age: 45
End: 2020-11-19

## 2020-12-07 ENCOUNTER — TELEPHONE (OUTPATIENT)
Dept: OBGYN CLINIC | Facility: CLINIC | Age: 45
End: 2020-12-07

## 2021-01-12 ENCOUNTER — PROCEDURE VISIT (OUTPATIENT)
Dept: OBGYN CLINIC | Facility: MEDICAL CENTER | Age: 46
End: 2021-01-12
Payer: COMMERCIAL

## 2021-01-12 VITALS — SYSTOLIC BLOOD PRESSURE: 102 MMHG | WEIGHT: 129.4 LBS | DIASTOLIC BLOOD PRESSURE: 66 MMHG | BODY MASS INDEX: 20.27 KG/M2

## 2021-01-12 DIAGNOSIS — Z32.02 NEGATIVE PREGNANCY TEST: ICD-10-CM

## 2021-01-12 DIAGNOSIS — Z30.430 ENCOUNTER FOR INSERTION OF MIRENA IUD: Primary | ICD-10-CM

## 2021-01-12 DIAGNOSIS — Z11.3 SCREENING FOR STDS (SEXUALLY TRANSMITTED DISEASES): ICD-10-CM

## 2021-01-12 LAB — SL AMB POCT URINE HCG: NEGATIVE

## 2021-01-12 PROCEDURE — 81025 URINE PREGNANCY TEST: CPT | Performed by: STUDENT IN AN ORGANIZED HEALTH CARE EDUCATION/TRAINING PROGRAM

## 2021-01-12 PROCEDURE — 87491 CHLMYD TRACH DNA AMP PROBE: CPT | Performed by: STUDENT IN AN ORGANIZED HEALTH CARE EDUCATION/TRAINING PROGRAM

## 2021-01-12 PROCEDURE — 87591 N.GONORRHOEAE DNA AMP PROB: CPT | Performed by: STUDENT IN AN ORGANIZED HEALTH CARE EDUCATION/TRAINING PROGRAM

## 2021-01-12 PROCEDURE — 58300 INSERT INTRAUTERINE DEVICE: CPT | Performed by: STUDENT IN AN ORGANIZED HEALTH CARE EDUCATION/TRAINING PROGRAM

## 2021-01-12 NOTE — PROGRESS NOTES
Iud insertions    Date/Time: 1/12/2021 1:22 PM  Performed by: Rita Hogan MD  Authorized by: Rita Hogan MD   Universal Protocol:  Consent: Verbal consent obtained  Written consent obtained  Risks and benefits: risks, benefits and alternatives were discussed  Consent given by: patient  Patient understanding: patient states understanding of the procedure being performed  Patient consent: the patient's understanding of the procedure matches consent given  Procedure consent: procedure consent matches procedure scheduled        Procedure:     Negative GC/chlamydia test: no      Negative urine pregnancy test: yes      Cervix cleaned and prepped: yes      Speculum placed in vagina: yes      Tenaculum applied to cervix: yes      Uterus sounded: yes      Uterus sound depth (cm):  8    IUD inserted with no complications: yes      IUD type:  Mirena    Strings trimmed: yes    Post-procedure:     Patient tolerated procedure well: yes      Patient will follow up after next period: yes    Comments:      Has been on OCP, negative urine hcg today  Uncomplicated placement  Will continue OCP for 2 wks or use back up contraception  F/u in 1 month for string check

## 2021-01-17 LAB
C TRACH DNA SPEC QL NAA+PROBE: NEGATIVE
N GONORRHOEA DNA SPEC QL NAA+PROBE: NEGATIVE

## 2021-04-15 ENCOUNTER — OFFICE VISIT (OUTPATIENT)
Dept: FAMILY MEDICINE CLINIC | Facility: CLINIC | Age: 46
End: 2021-04-15
Payer: COMMERCIAL

## 2021-04-15 ENCOUNTER — APPOINTMENT (OUTPATIENT)
Dept: LAB | Facility: CLINIC | Age: 46
End: 2021-04-15
Payer: COMMERCIAL

## 2021-04-15 ENCOUNTER — TRANSCRIBE ORDERS (OUTPATIENT)
Dept: LAB | Facility: CLINIC | Age: 46
End: 2021-04-15

## 2021-04-15 ENCOUNTER — TELEPHONE (OUTPATIENT)
Dept: OBGYN CLINIC | Facility: MEDICAL CENTER | Age: 46
End: 2021-04-15

## 2021-04-15 VITALS
RESPIRATION RATE: 16 BRPM | DIASTOLIC BLOOD PRESSURE: 68 MMHG | OXYGEN SATURATION: 96 % | WEIGHT: 127 LBS | HEIGHT: 67 IN | HEART RATE: 71 BPM | SYSTOLIC BLOOD PRESSURE: 102 MMHG | BODY MASS INDEX: 19.93 KG/M2

## 2021-04-15 DIAGNOSIS — Z13.29 SCREENING FOR THYROID DISORDER: ICD-10-CM

## 2021-04-15 DIAGNOSIS — Z13.83 SCREENING FOR CARDIOVASCULAR, RESPIRATORY, AND GENITOURINARY DISEASES: ICD-10-CM

## 2021-04-15 DIAGNOSIS — E55.9 VITAMIN D DEFICIENCY: ICD-10-CM

## 2021-04-15 DIAGNOSIS — Z79.899 OTHER LONG TERM (CURRENT) DRUG THERAPY: ICD-10-CM

## 2021-04-15 DIAGNOSIS — E53.8 B12 DEFICIENCY: ICD-10-CM

## 2021-04-15 DIAGNOSIS — E61.1 IRON DEFICIENCY: ICD-10-CM

## 2021-04-15 DIAGNOSIS — E61.1 IRON DEFICIENCY: Primary | ICD-10-CM

## 2021-04-15 DIAGNOSIS — Z12.31 ENCOUNTER FOR SCREENING MAMMOGRAM FOR BREAST CANCER: ICD-10-CM

## 2021-04-15 DIAGNOSIS — Z13.89 SCREENING FOR CARDIOVASCULAR, RESPIRATORY, AND GENITOURINARY DISEASES: ICD-10-CM

## 2021-04-15 DIAGNOSIS — Z11.59 ENCOUNTER FOR HEPATITIS C SCREENING TEST FOR LOW RISK PATIENT: ICD-10-CM

## 2021-04-15 DIAGNOSIS — K51.90 ULCERATIVE COLITIS WITHOUT COMPLICATIONS, UNSPECIFIED LOCATION (HCC): ICD-10-CM

## 2021-04-15 DIAGNOSIS — Z00.00 WELL ADULT EXAM: Primary | ICD-10-CM

## 2021-04-15 DIAGNOSIS — Z11.3 SCREEN FOR STD (SEXUALLY TRANSMITTED DISEASE): ICD-10-CM

## 2021-04-15 DIAGNOSIS — Z13.6 SCREENING FOR CARDIOVASCULAR, RESPIRATORY, AND GENITOURINARY DISEASES: ICD-10-CM

## 2021-04-15 DIAGNOSIS — Z00.00 WELL ADULT EXAM: ICD-10-CM

## 2021-04-15 LAB
25(OH)D3 SERPL-MCNC: 36.1 NG/ML (ref 30–100)
ALBUMIN SERPL BCP-MCNC: 4.5 G/DL (ref 3.5–5)
ALP SERPL-CCNC: 35 U/L (ref 46–116)
ALT SERPL W P-5'-P-CCNC: 20 U/L (ref 12–78)
ANION GAP SERPL CALCULATED.3IONS-SCNC: 4 MMOL/L (ref 4–13)
AST SERPL W P-5'-P-CCNC: 12 U/L (ref 5–45)
BASOPHILS # BLD AUTO: 0.05 THOUSANDS/ΜL (ref 0–0.1)
BASOPHILS NFR BLD AUTO: 1 % (ref 0–1)
BILIRUB SERPL-MCNC: 1.53 MG/DL (ref 0.2–1)
BILIRUB UR QL STRIP: NEGATIVE
BUN SERPL-MCNC: 19 MG/DL (ref 5–25)
CALCIUM SERPL-MCNC: 9.6 MG/DL (ref 8.3–10.1)
CHLORIDE SERPL-SCNC: 107 MMOL/L (ref 100–108)
CHOLEST SERPL-MCNC: 231 MG/DL (ref 50–200)
CLARITY UR: NORMAL
CO2 SERPL-SCNC: 27 MMOL/L (ref 21–32)
COLOR UR: YELLOW
CREAT SERPL-MCNC: 0.9 MG/DL (ref 0.6–1.3)
EOSINOPHIL # BLD AUTO: 0.06 THOUSAND/ΜL (ref 0–0.61)
EOSINOPHIL NFR BLD AUTO: 1 % (ref 0–6)
ERYTHROCYTE [DISTWIDTH] IN BLOOD BY AUTOMATED COUNT: 13.3 % (ref 11.6–15.1)
EST. AVERAGE GLUCOSE BLD GHB EST-MCNC: 108 MG/DL
FERRITIN SERPL-MCNC: 16 NG/ML (ref 8–388)
GFR SERPL CREATININE-BSD FRML MDRD: 78 ML/MIN/1.73SQ M
GLUCOSE P FAST SERPL-MCNC: 90 MG/DL (ref 65–99)
GLUCOSE UR STRIP-MCNC: NEGATIVE MG/DL
HBA1C MFR BLD: 5.4 %
HCT VFR BLD AUTO: 47.4 % (ref 34.8–46.1)
HDLC SERPL-MCNC: 87 MG/DL
HGB BLD-MCNC: 15 G/DL (ref 11.5–15.4)
HGB UR QL STRIP.AUTO: NEGATIVE
IMM GRANULOCYTES # BLD AUTO: 0.01 THOUSAND/UL (ref 0–0.2)
IMM GRANULOCYTES NFR BLD AUTO: 0 % (ref 0–2)
IRON SATN MFR SERPL: 22 %
IRON SERPL-MCNC: 93 UG/DL (ref 50–170)
KETONES UR STRIP-MCNC: NEGATIVE MG/DL
LDLC SERPL CALC-MCNC: 135 MG/DL (ref 0–100)
LEUKOCYTE ESTERASE UR QL STRIP: NEGATIVE
LYMPHOCYTES # BLD AUTO: 1.89 THOUSANDS/ΜL (ref 0.6–4.47)
LYMPHOCYTES NFR BLD AUTO: 29 % (ref 14–44)
MCH RBC QN AUTO: 30.7 PG (ref 26.8–34.3)
MCHC RBC AUTO-ENTMCNC: 31.6 G/DL (ref 31.4–37.4)
MCV RBC AUTO: 97 FL (ref 82–98)
MONOCYTES # BLD AUTO: 0.55 THOUSAND/ΜL (ref 0.17–1.22)
MONOCYTES NFR BLD AUTO: 9 % (ref 4–12)
NEUTROPHILS # BLD AUTO: 3.92 THOUSANDS/ΜL (ref 1.85–7.62)
NEUTS SEG NFR BLD AUTO: 60 % (ref 43–75)
NITRITE UR QL STRIP: NEGATIVE
NONHDLC SERPL-MCNC: 144 MG/DL
NRBC BLD AUTO-RTO: 0 /100 WBCS
PH UR STRIP.AUTO: 5.5 [PH]
PLATELET # BLD AUTO: 292 THOUSANDS/UL (ref 149–390)
PMV BLD AUTO: 10 FL (ref 8.9–12.7)
POTASSIUM SERPL-SCNC: 4.7 MMOL/L (ref 3.5–5.3)
PROT SERPL-MCNC: 8 G/DL (ref 6.4–8.2)
PROT UR STRIP-MCNC: NEGATIVE MG/DL
RBC # BLD AUTO: 4.88 MILLION/UL (ref 3.81–5.12)
SODIUM SERPL-SCNC: 138 MMOL/L (ref 136–145)
SP GR UR STRIP.AUTO: 1.03 (ref 1–1.03)
TIBC SERPL-MCNC: 425 UG/DL (ref 250–450)
TRIGL SERPL-MCNC: 46 MG/DL
TSH SERPL DL<=0.05 MIU/L-ACNC: 0.97 UIU/ML (ref 0.36–3.74)
UROBILINOGEN UR QL STRIP.AUTO: 0.2 E.U./DL
VIT B12 SERPL-MCNC: 382 PG/ML (ref 100–900)
WBC # BLD AUTO: 6.48 THOUSAND/UL (ref 4.31–10.16)

## 2021-04-15 PROCEDURE — 82728 ASSAY OF FERRITIN: CPT

## 2021-04-15 PROCEDURE — 84443 ASSAY THYROID STIM HORMONE: CPT

## 2021-04-15 PROCEDURE — 3008F BODY MASS INDEX DOCD: CPT | Performed by: FAMILY MEDICINE

## 2021-04-15 PROCEDURE — 36415 COLL VENOUS BLD VENIPUNCTURE: CPT

## 2021-04-15 PROCEDURE — 83550 IRON BINDING TEST: CPT

## 2021-04-15 PROCEDURE — 86695 HERPES SIMPLEX TYPE 1 TEST: CPT

## 2021-04-15 PROCEDURE — 82607 VITAMIN B-12: CPT

## 2021-04-15 PROCEDURE — 86696 HERPES SIMPLEX TYPE 2 TEST: CPT

## 2021-04-15 PROCEDURE — 81003 URINALYSIS AUTO W/O SCOPE: CPT

## 2021-04-15 PROCEDURE — 85025 COMPLETE CBC W/AUTO DIFF WBC: CPT

## 2021-04-15 PROCEDURE — 80074 ACUTE HEPATITIS PANEL: CPT

## 2021-04-15 PROCEDURE — 1036F TOBACCO NON-USER: CPT | Performed by: FAMILY MEDICINE

## 2021-04-15 PROCEDURE — 86592 SYPHILIS TEST NON-TREP QUAL: CPT

## 2021-04-15 PROCEDURE — 87491 CHLMYD TRACH DNA AMP PROBE: CPT

## 2021-04-15 PROCEDURE — 83036 HEMOGLOBIN GLYCOSYLATED A1C: CPT

## 2021-04-15 PROCEDURE — 80053 COMPREHEN METABOLIC PANEL: CPT

## 2021-04-15 PROCEDURE — 80061 LIPID PANEL: CPT

## 2021-04-15 PROCEDURE — 83540 ASSAY OF IRON: CPT

## 2021-04-15 PROCEDURE — 87389 HIV-1 AG W/HIV-1&-2 AB AG IA: CPT

## 2021-04-15 PROCEDURE — 99396 PREV VISIT EST AGE 40-64: CPT | Performed by: FAMILY MEDICINE

## 2021-04-15 PROCEDURE — 3725F SCREEN DEPRESSION PERFORMED: CPT | Performed by: FAMILY MEDICINE

## 2021-04-15 PROCEDURE — 87591 N.GONORRHOEAE DNA AMP PROB: CPT

## 2021-04-15 PROCEDURE — 82306 VITAMIN D 25 HYDROXY: CPT

## 2021-04-15 NOTE — TELEPHONE ENCOUNTER
Lm to pt to cb to see what labwork she was going for today? Let CS know  Per Marleny Scott pt going ot a [facility] but forgot/lost her scripts  We saw nothing in notes or labs

## 2021-04-15 NOTE — PROGRESS NOTES
Assessment/Plan:    1  Well adult exam  -     Lipid panel; Future; Expected date: 04/15/2021  -     Comprehensive metabolic panel; Future; Expected date: 04/15/2021  -     CBC and differential; Future; Expected date: 04/15/2021    2  Encounter for screening mammogram for breast cancer  -     Mammo screening bilateral w 3d & cad; Future; Expected date: 04/15/2021    3  Encounter for hepatitis C screening test for low risk patient    4  Iron deficiency  -     Iron, TIBC and Ferritin Panel; Future; Expected date: 04/15/2021    5  B12 deficiency  -     Vitamin B12; Future; Expected date: 04/15/2021    6  Vitamin D deficiency  -     Vitamin D 25 hydroxy; Future; Expected date: 04/15/2021    7  Screen for STD (sexually transmitted disease)  -     Hepatitis panel, acute; Future  -     Chlamydia/GC amplified DNA by PCR; Future  -     RPR; Future  -     HIV 1/2 Antigen/Antibody (4th Generation) w Reflex [facility]; Future  -     HSV 1 and 2-Spec Ab, IgG w/Reflex; Future    8  Other long term (current) drug therapy  -     Hemoglobin A1C; Future; Expected date: 04/15/2021    9  Screening for thyroid disorder  -     TSH, 3rd generation with Free T4 reflex; Future; Expected date: 04/15/2021    10  Screening for cardiovascular, respiratory, and genitourinary diseases  -     UA w Reflex to Microscopic w Reflex to Culture; Future; Expected date: 04/15/2021    11  Ulcerative colitis without complications, unspecified location [facility])  Assessment & Plan:  Mild   Dx 1996   Colonoscopy q3-5 yrs with kenyatta            Subjective:      Patient ID: Alycia Mcgill is a 41 y o  female      HPI   Was in a doctors office and found to have bp 160/90   She never had this before  She didn't have any sx       The following portions of the patient's history were reviewed and updated as appropriate: allergies, current medications, past family history, past medical history, past social history, past surgical history and problem list     Review of Systems Constitutional: Negative for fever and unexpected weight change  HENT: Negative for nosebleeds and trouble swallowing  Eyes: Negative for visual disturbance  Respiratory: Negative for chest tightness and shortness of breath  Cardiovascular: Negative for chest pain, palpitations and leg swelling  Gastrointestinal: Negative for abdominal pain, constipation, diarrhea and nausea  Endocrine: Negative for cold intolerance  Genitourinary: Negative for dysuria and urgency  Musculoskeletal: Negative for joint swelling and myalgias  Skin: Negative for rash  Neurological: Negative for tremors, seizures and syncope  Hematological: Does not bruise/bleed easily  Psychiatric/Behavioral: Negative for hallucinations and suicidal ideas  Objective:      /68 (BP Location: Left arm)   Pulse 71   Resp 16   Ht 5' 7" (1 702 m)   Wt 57 6 kg (127 lb)   SpO2 96%   BMI 19 89 kg/m²     No visits with results within 2 Week(s) from this visit  Latest known visit with results is:   Procedure visit on 01/12/2021   Component Date Value    URINE HCG 01/12/2021 negative     N gonorrhoeae, DNA Probe 01/12/2021 Negative     Chlamydia trachomatis, D* 01/12/2021 Negative           Physical Exam  Vitals signs and nursing note reviewed  Constitutional:       Appearance: She is well-developed  HENT:      Head: Normocephalic and atraumatic  Neck:      Musculoskeletal: Normal range of motion and neck supple  Cardiovascular:      Rate and Rhythm: Normal rate and regular rhythm  Heart sounds: Normal heart sounds  No murmur  Pulmonary:      Effort: Pulmonary effort is normal       Breath sounds: Normal breath sounds  No wheezing or rales  Abdominal:      General: Bowel sounds are normal  There is no distension  Palpations: Abdomen is soft  Tenderness: There is no abdominal tenderness  Musculoskeletal: Normal range of motion  General: No tenderness     Lymphadenopathy: Cervical: No cervical adenopathy  Skin:     General: Skin is warm and dry  Capillary Refill: Capillary refill takes less than 2 seconds  Findings: No rash  Neurological:      Mental Status: She is alert and oriented to person, place, and time  Cranial Nerves: No cranial nerve deficit  Sensory: No sensory deficit  Motor: No abnormal muscle tone  Psychiatric:         Behavior: Behavior normal          Thought Content:  Thought content normal          Judgment: Judgment normal              Ihsan Mcgill MD  [place] [id number]

## 2021-04-15 NOTE — TELEPHONE ENCOUNTER
Call from Scot knowles pt was going to lab and forget her scripts  We both looked in chart and didn't see what might have been ordered  (given hard copy)? Per last visit notes 1/1221 - IUD insert was just supposed to f/u in 1 month for a string check and does not look like she came for that  Scot knowles said pt would call her when she got to the lab with fax # also  We just dont know what she needed done

## 2021-04-16 LAB
C TRACH DNA SPEC QL NAA+PROBE: NEGATIVE
HAV IGM SER QL: NORMAL
HBV CORE IGM SER QL: NORMAL
HBV SURFACE AG SER QL: NORMAL
HCV AB SER QL: NORMAL
HIV 1+2 AB+HIV1 P24 AG SERPL QL IA: NORMAL
N GONORRHOEA DNA SPEC QL NAA+PROBE: NEGATIVE
RPR SER QL: NORMAL

## 2021-04-17 LAB
HSV1 IGG SER IA-ACNC: <0.91 INDEX (ref 0–0.9)
HSV2 IGG SER IA-ACNC: <0.91 INDEX (ref 0–0.9)

## 2021-05-03 ENCOUNTER — OFFICE VISIT (OUTPATIENT)
Dept: OBGYN CLINIC | Facility: MEDICAL CENTER | Age: 46
End: 2021-05-03
Payer: COMMERCIAL

## 2021-05-03 VITALS — DIASTOLIC BLOOD PRESSURE: 76 MMHG | BODY MASS INDEX: 19.98 KG/M2 | WEIGHT: 127.6 LBS | SYSTOLIC BLOOD PRESSURE: 118 MMHG

## 2021-05-03 DIAGNOSIS — Z97.5 IUD (INTRAUTERINE DEVICE) IN PLACE: Primary | ICD-10-CM

## 2021-05-03 PROCEDURE — 99213 OFFICE O/P EST LOW 20 MIN: CPT | Performed by: STUDENT IN AN ORGANIZED HEALTH CARE EDUCATION/TRAINING PROGRAM

## 2021-05-03 NOTE — PROGRESS NOTES
Assessment/Plan:   IUD (intrauterine device) in place  String check appropriate  Discussed bleeding profiles to expect  Return for annual       Diagnoses and all orders for this visit:    IUD (intrauterine device) in place          Subjective:     Patient ID: Isadora Garcia is a 41 y o  female  40 yo  here for string check  IUD placed 2021  She is doing great with it  One cycle since placement  No pain or issues with intercourse  Review of Systems   Constitutional: Negative for chills and fever  HENT: Negative for ear pain and sore throat  Eyes: Negative for pain and visual disturbance  Respiratory: Negative for cough and shortness of breath  Cardiovascular: Negative for chest pain and palpitations  Gastrointestinal: Negative for abdominal pain, constipation, diarrhea, nausea and vomiting  Genitourinary: Negative for dyspareunia, dysuria, frequency, hematuria, urgency, vaginal bleeding, vaginal discharge and vaginal pain  Musculoskeletal: Negative for arthralgias and back pain  Skin: Negative for color change and rash  Neurological: Negative for seizures and syncope  All other systems reviewed and are negative  Objective:     Physical Exam  Vitals signs reviewed  Constitutional:       General: She is not in acute distress  Appearance: She is well-developed  She is not diaphoretic  HENT:      Head: Normocephalic and atraumatic  Neck:      Musculoskeletal: Normal range of motion  Pulmonary:      Effort: Pulmonary effort is normal  No respiratory distress  Genitourinary:     Labia:         Right: No rash, tenderness, lesion or injury  Left: No rash, tenderness, lesion or injury  Cervix: Normal             Comments: Very anteverted uterus  Vaginal tissue normal   Neurological:      Mental Status: She is alert and oriented to person, place, and time  Psychiatric:         Behavior: Behavior normal          Thought Content:  Thought content normal          Judgment: Judgment normal

## 2021-08-04 ENCOUNTER — OFFICE VISIT (OUTPATIENT)
Dept: FAMILY MEDICINE CLINIC | Facility: CLINIC | Age: 46
End: 2021-08-04
Payer: COMMERCIAL

## 2021-08-04 VITALS
WEIGHT: 125 LBS | BODY MASS INDEX: 19.62 KG/M2 | TEMPERATURE: 97.3 F | RESPIRATION RATE: 16 BRPM | DIASTOLIC BLOOD PRESSURE: 70 MMHG | HEART RATE: 67 BPM | OXYGEN SATURATION: 96 % | HEIGHT: 67 IN | SYSTOLIC BLOOD PRESSURE: 102 MMHG

## 2021-08-04 DIAGNOSIS — E78.2 MIXED HYPERLIPIDEMIA: ICD-10-CM

## 2021-08-04 DIAGNOSIS — M75.101 ROTATOR CUFF SYNDROME OF RIGHT SHOULDER: ICD-10-CM

## 2021-08-04 DIAGNOSIS — E53.8 B12 DEFICIENCY: ICD-10-CM

## 2021-08-04 DIAGNOSIS — E61.1 IRON DEFICIENCY: Primary | ICD-10-CM

## 2021-08-04 DIAGNOSIS — F41.9 ANXIETY: ICD-10-CM

## 2021-08-04 PROCEDURE — 99214 OFFICE O/P EST MOD 30 MIN: CPT | Performed by: FAMILY MEDICINE

## 2021-08-04 PROCEDURE — 1036F TOBACCO NON-USER: CPT | Performed by: FAMILY MEDICINE

## 2021-08-04 PROCEDURE — 3008F BODY MASS INDEX DOCD: CPT | Performed by: FAMILY MEDICINE

## 2021-08-04 NOTE — PROGRESS NOTES
Assessment/Plan:    1  Iron deficiency    2  B12 deficiency    3  Rotator cuff syndrome of right shoulder    4  Mixed hyperlipidemia    5  Anxiety       Benadryl for anxiety   voltaren for shoulder   b12 1000mcg   And bisglycinate for iron   Dietary chagnes for HLD    Subjective:      Patient ID: Kolton Fay is a 41 y o  female  HPI   Lab review   Ferritin low end of normal   HLD: HDL AND LDL off set eachother   b12 was slightyly low   She is having more anxiety   And she is c/o right shoulder pain x 6 months with a poping sound trying to rip out a small tree     The following portions of the patient's history were reviewed and updated as appropriate: allergies, current medications, past family history, past medical history, past social history, past surgical history and problem list     Review of Systems   Constitutional: Negative for fever and unexpected weight change  HENT: Negative for nosebleeds and trouble swallowing  Eyes: Negative for visual disturbance  Respiratory: Negative for chest tightness and shortness of breath  Cardiovascular: Negative for chest pain, palpitations and leg swelling  Gastrointestinal: Negative for abdominal pain, constipation, diarrhea and nausea  Endocrine: Negative for cold intolerance  Genitourinary: Negative for dysuria and urgency  Musculoskeletal: Negative for joint swelling and myalgias  Skin: Negative for rash  Neurological: Negative for tremors, seizures and syncope  Hematological: Does not bruise/bleed easily  Psychiatric/Behavioral: Negative for hallucinations and suicidal ideas  Objective:      /70 (BP Location: Right arm, Patient Position: Sitting, Cuff Size: Standard)   Pulse 67   Temp (!) 97 3 °F (36 3 °C) (Tympanic)   Resp 16   Ht 5' 7" (1 702 m)   Wt 56 7 kg (125 lb)   SpO2 96%   BMI 19 58 kg/m²     No visits with results within 2 Week(s) from this visit     Latest known visit with results is:   Appointment on 04/15/2021   Component Date Value    TSH 3RD GENERATON 04/15/2021 0 967     Vit D, 25-Hydroxy 04/15/2021 36 1     Cholesterol 04/15/2021 231*    Triglycerides 04/15/2021 46     HDL, Direct 04/15/2021 87     LDL Calculated 04/15/2021 135*    Non-HDL-Chol (CHOL-HDL) 04/15/2021 144     Sodium 04/15/2021 138     Potassium 04/15/2021 4 7     Chloride 04/15/2021 107     CO2 04/15/2021 27     ANION GAP 04/15/2021 4     BUN 04/15/2021 19     Creatinine 04/15/2021 0 90     Glucose, Fasting 04/15/2021 90     Calcium 04/15/2021 9 6     AST 04/15/2021 12     ALT 04/15/2021 20     Alkaline Phosphatase 04/15/2021 35*    Total Protein 04/15/2021 8 0     Albumin 04/15/2021 4 5     Total Bilirubin 04/15/2021 1 53*    eGFR 04/15/2021 78     Vitamin B-12 04/15/2021 382     Color, UA 04/15/2021 Yellow     Clarity, UA 04/15/2021 Turbid     Specific Gravity, UA 04/15/2021 1 026     pH, UA 04/15/2021 5 5     Leukocytes, UA 04/15/2021 Negative     Nitrite, UA 04/15/2021 Negative     Protein, UA 04/15/2021 Negative     Glucose, UA 04/15/2021 Negative     Ketones, UA 04/15/2021 Negative     Urobilinogen, UA 04/15/2021 0 2     Bilirubin, UA 04/15/2021 Negative     Blood, UA 04/15/2021 Negative     Hemoglobin A1C 04/15/2021 5 4     EAG 04/15/2021 108     WBC 04/15/2021 6 48     RBC 04/15/2021 4 88     Hemoglobin 04/15/2021 15 0     Hematocrit 04/15/2021 47 4*    MCV 04/15/2021 97     MCH 04/15/2021 30 7     MCHC 04/15/2021 31 6     RDW 04/15/2021 13 3     MPV 04/15/2021 10 0     Platelets 11/42/0134 292     nRBC 04/15/2021 0     Neutrophils Relative 04/15/2021 60     Immat GRANS % 04/15/2021 0     Lymphocytes Relative 04/15/2021 29     Monocytes Relative 04/15/2021 9     Eosinophils Relative 04/15/2021 1     Basophils Relative 04/15/2021 1     Neutrophils Absolute 04/15/2021 3 92     Immature Grans Absolute 04/15/2021 0 01     Lymphocytes Absolute 04/15/2021 1 89     Monocytes Absolute 04/15/2021 0 55     Eosinophils Absolute 04/15/2021 0 06     Basophils Absolute 04/15/2021 0 05     Hepatitis B Surface Ag 04/15/2021 Non-reactive     Hep A IgM 04/15/2021 Non-reactive     Hepatitis C Ab 04/15/2021 Non-reactive     Hep B C IgM 04/15/2021 Non-reactive     N gonorrhoeae, DNA Probe 04/15/2021 Negative     Chlamydia trachomatis, D* 04/15/2021 Negative     RPR 04/15/2021 Non-Reactive     HIV-1/HIV-2 Ab 04/15/2021 Non-Reactive     Iron Saturation 04/15/2021 22     TIBC 04/15/2021 425     Iron 04/15/2021 93     Ferritin 04/15/2021 16     HSV 1 IgG 04/15/2021 <0 91     HSV 2 IGG, TYPE SPEC 04/15/2021 <0 91           Physical Exam  Vitals and nursing note reviewed  Constitutional:       Appearance: She is well-developed  HENT:      Head: Normocephalic and atraumatic  Cardiovascular:      Rate and Rhythm: Normal rate and regular rhythm  Heart sounds: Normal heart sounds  No murmur heard  Pulmonary:      Effort: Pulmonary effort is normal       Breath sounds: Normal breath sounds  No wheezing or rales  Abdominal:      General: Bowel sounds are normal  There is no distension  Palpations: Abdomen is soft  Tenderness: There is no abdominal tenderness  Musculoskeletal:         General: No tenderness  Normal range of motion  Cervical back: Normal range of motion and neck supple  Lymphadenopathy:      Cervical: No cervical adenopathy  Skin:     General: Skin is warm and dry  Capillary Refill: Capillary refill takes less than 2 seconds  Findings: No rash  Neurological:      Mental Status: She is alert and oriented to person, place, and time  Cranial Nerves: No cranial nerve deficit  Sensory: No sensory deficit  Motor: No abnormal muscle tone  Psychiatric:         Behavior: Behavior normal          Thought Content:  Thought content normal          Judgment: Judgment normal              Uche Greenfield MD  [place] [id number]

## 2021-10-16 ENCOUNTER — IMMUNIZATIONS (OUTPATIENT)
Dept: FAMILY MEDICINE CLINIC | Facility: HOSPITAL | Age: 46
End: 2021-10-16

## 2021-10-16 DIAGNOSIS — Z23 ENCOUNTER FOR IMMUNIZATION: Primary | ICD-10-CM

## 2021-10-16 PROCEDURE — 0001A SARS-COV-2 / COVID-19 MRNA VACCINE (PFIZER-BIONTECH) 30 MCG: CPT

## 2021-10-16 PROCEDURE — 91300 SARS-COV-2 / COVID-19 MRNA VACCINE (PFIZER-BIONTECH) 30 MCG: CPT

## 2021-11-06 ENCOUNTER — IMMUNIZATIONS (OUTPATIENT)
Dept: FAMILY MEDICINE CLINIC | Facility: HOSPITAL | Age: 46
End: 2021-11-06

## 2021-11-06 DIAGNOSIS — Z23 ENCOUNTER FOR IMMUNIZATION: Primary | ICD-10-CM

## 2021-11-06 PROCEDURE — 91300 COVID-19 PFIZER VACC 0.3 ML: CPT

## 2021-11-06 PROCEDURE — 0002A COVID-19 PFIZER VACC 0.3 ML: CPT

## 2022-09-27 NOTE — TELEPHONE ENCOUNTER
Patient has an appointment on 2/13/19 but wanted to speak with someone about a UTI  Has been on medication for two days - still experiencing symptoms  Doxycycline Counseling:  Patient counseled regarding possible photosensitivity and increased risk for sunburn.  Patient instructed to avoid sunlight, if possible.  When exposed to sunlight, patients should wear protective clothing, sunglasses, and sunscreen.  The patient was instructed to call the office immediately if the following severe adverse effects occur:  hearing changes, easy bruising/bleeding, severe headache, or vision changes.  The patient verbalized understanding of the proper use and possible adverse effects of doxycycline.  All of the patient's questions and concerns were addressed.

## 2023-04-19 ENCOUNTER — APPOINTMENT (OUTPATIENT)
Dept: RADIOLOGY | Facility: CLINIC | Age: 48
End: 2023-04-19

## 2023-04-19 DIAGNOSIS — M25.561 CHRONIC PAIN OF RIGHT KNEE: ICD-10-CM

## 2023-04-19 DIAGNOSIS — G89.29 CHRONIC PAIN OF RIGHT KNEE: ICD-10-CM

## 2023-04-19 DIAGNOSIS — G89.29 CHRONIC RIGHT SHOULDER PAIN: ICD-10-CM

## 2023-04-19 DIAGNOSIS — M25.511 CHRONIC RIGHT SHOULDER PAIN: ICD-10-CM

## 2023-04-25 ENCOUNTER — TELEPHONE (OUTPATIENT)
Dept: FAMILY MEDICINE CLINIC | Facility: CLINIC | Age: 48
End: 2023-04-25

## 2023-04-25 NOTE — TELEPHONE ENCOUNTER
----- Message from Jaylen Adams MD sent at 4/25/2023  1:03 PM EDT -----  Mild arthritis to the knee   Rehab will help it

## 2023-04-25 NOTE — TELEPHONE ENCOUNTER
----- Message from Deric Dewitt MD sent at 4/25/2023  1:04 PM EDT -----  Lets see if Rehab will help   If not then I have a good shoulder richi too

## 2023-05-18 ENCOUNTER — TELEPHONE (OUTPATIENT)
Dept: GASTROENTEROLOGY | Facility: CLINIC | Age: 48
End: 2023-05-18

## 2023-05-18 DIAGNOSIS — K51.90 ULCERATIVE COLITIS WITHOUT COMPLICATIONS, UNSPECIFIED LOCATION (HCC): Primary | ICD-10-CM

## 2023-05-18 NOTE — TELEPHONE ENCOUNTER
lmom confirming pt's colonoscopy scheduled on 6/2/23 at [facility] with Dr Nathen Valdez   Informed [facility] would be calling 1-2 days prior with the arrival time  Informed of clear liquid diet day prior as well as the bowel cleansing preparation  Informed would need a  the day of the procedure due to being under sedation  I informed pt that a script for the bowel prep would be sent into her [facility] pharmacy and that I would be emailing her the instructions  I asked her to please call back if has any questions

## 2023-08-25 ENCOUNTER — ANNUAL EXAM (OUTPATIENT)
Dept: OBGYN CLINIC | Facility: MEDICAL CENTER | Age: 48
End: 2023-08-25

## 2023-08-25 VITALS
BODY MASS INDEX: 19.3 KG/M2 | HEIGHT: 67 IN | SYSTOLIC BLOOD PRESSURE: 120 MMHG | DIASTOLIC BLOOD PRESSURE: 60 MMHG | WEIGHT: 123 LBS

## 2023-08-25 DIAGNOSIS — Z01.419 ENCOUNTER FOR ANNUAL ROUTINE GYNECOLOGICAL EXAMINATION: Primary | ICD-10-CM

## 2023-08-25 DIAGNOSIS — N89.8 LEUKORRHEA: ICD-10-CM

## 2023-08-25 DIAGNOSIS — N30.01 ACUTE CYSTITIS WITH HEMATURIA: ICD-10-CM

## 2023-08-25 DIAGNOSIS — R39.15 URINARY URGENCY: ICD-10-CM

## 2023-08-25 DIAGNOSIS — Z12.4 ENCOUNTER FOR SCREENING FOR CERVICAL CANCER: ICD-10-CM

## 2023-08-25 DIAGNOSIS — Z97.5 IUD (INTRAUTERINE DEVICE) IN PLACE: ICD-10-CM

## 2023-08-25 DIAGNOSIS — Z11.3 SCREEN FOR STD (SEXUALLY TRANSMITTED DISEASE): ICD-10-CM

## 2023-08-25 DIAGNOSIS — Z12.39 ENCOUNTER FOR OTHER SCREENING FOR MALIGNANT NEOPLASM OF BREAST: ICD-10-CM

## 2023-08-25 LAB
BACTERIA UR QL AUTO: ABNORMAL /HPF
BILIRUB UR QL STRIP: NEGATIVE
BV WHIFF TEST VAG QL: NORMAL
CAOX CRY URNS QL MICRO: ABNORMAL /HPF
CLARITY UR: ABNORMAL
CLUE CELLS SPEC QL WET PREP: NORMAL
COLOR UR: YELLOW
GLUCOSE UR STRIP-MCNC: ABNORMAL MG/DL
HGB UR QL STRIP.AUTO: ABNORMAL
KETONES UR STRIP-MCNC: NEGATIVE MG/DL
LEUKOCYTE ESTERASE UR QL STRIP: ABNORMAL
MUCOUS THREADS UR QL AUTO: ABNORMAL
NITRITE UR QL STRIP: NEGATIVE
NON-SQ EPI CELLS URNS QL MICRO: ABNORMAL /HPF
PH SMN: 3.5 [PH]
PH UR STRIP.AUTO: 6 [PH]
PROT UR STRIP-MCNC: ABNORMAL MG/DL
RBC #/AREA URNS AUTO: ABNORMAL /HPF
SL AMB  POCT GLUCOSE, UA: ABNORMAL
SL AMB LEUKOCYTE ESTERASE,UA: 1
SL AMB POCT BILIRUBIN,UA: ABNORMAL
SL AMB POCT BLOOD,UA: 3
SL AMB POCT KETONES,UA: ABNORMAL
SL AMB POCT NITRITE,UA: ABNORMAL
SL AMB POCT PH,UA: 7
SL AMB POCT SPECIFIC GRAVITY,UA: 1.02
SL AMB POCT URINE PROTEIN: ABNORMAL
SL AMB POCT UROBILINOGEN: 0.2
SP GR UR STRIP.AUTO: >=1.03 (ref 1–1.03)
T VAGINALIS VAG QL WET PREP: NORMAL
TRANS CELLS #/AREA URNS HPF: PRESENT /[HPF]
UROBILINOGEN UR STRIP-ACNC: <2 MG/DL
WBC #/AREA URNS AUTO: ABNORMAL /HPF
YEAST VAG QL WET PREP: NORMAL

## 2023-08-25 PROCEDURE — 87491 CHLMYD TRACH DNA AMP PROBE: CPT | Performed by: CLINICAL NURSE SPECIALIST

## 2023-08-25 PROCEDURE — 87086 URINE CULTURE/COLONY COUNT: CPT | Performed by: CLINICAL NURSE SPECIALIST

## 2023-08-25 PROCEDURE — G0476 HPV COMBO ASSAY CA SCREEN: HCPCS | Performed by: CLINICAL NURSE SPECIALIST

## 2023-08-25 PROCEDURE — 81001 URINALYSIS AUTO W/SCOPE: CPT | Performed by: CLINICAL NURSE SPECIALIST

## 2023-08-25 PROCEDURE — 87591 N.GONORRHOEAE DNA AMP PROB: CPT | Performed by: CLINICAL NURSE SPECIALIST

## 2023-08-25 PROCEDURE — G0145 SCR C/V CYTO,THINLAYER,RESCR: HCPCS | Performed by: CLINICAL NURSE SPECIALIST

## 2023-08-25 RX ORDER — SULFAMETHOXAZOLE AND TRIMETHOPRIM 800; 160 MG/1; MG/1
1 TABLET ORAL 2 TIMES DAILY
Qty: 10 TABLET | Refills: 0 | Status: SHIPPED | OUTPATIENT
Start: 2023-08-25 | End: 2023-08-30

## 2023-08-25 NOTE — PROGRESS NOTES
Subjective:        Trini Elias is a 56 y.o. female. Here for Gynecologic Exam      GYN HPI  Here for annual gyn exam  Menstrual cycle:  Menstrual patttern: amenorrhea due to IUD  Vaginal c/o: + vaginal itching and irritation  Urinary c/o: + blood in urine  Breast complaints:denies  She does not do self breast Exams    Sexually active: yes  Contraception:  Mirena IUD  She reports she feels safe at home. The following portions of the patient's history were reviewed and updated as appropriate: allergies, current medications, past family history, past medical history, past social history, past surgical history, and problem list.    Hereditary Cancer Screening  Cancer-related family history includes Breast cancer in her family and maternal aunt; Lung cancer in her family and maternal aunt; Prostate cancer in her father; Skin cancer in her father, maternal grandmother, and mother. Substance Abuse Screening Completed. See hx and flowsheet. Screens Positive for none       HEALTH MAINTENANCE SCREENINGS:    History of abnormal pap: No, Last Papanicolaou test:  01/30/2018  History of abnormal mammogram: Yes, hx of lumpectomy and now has implants, Last mammogram:  12/22/2021  Last Colon Cancer Screening:  Not on file     Review of Systems   Constitutional: Negative for appetite change, chills, fatigue, fever and unexpected weight change. HENT: Negative. Eyes: Negative. Respiratory: Negative for chest tightness and shortness of breath. Cardiovascular: Negative for chest pain and palpitations. Gastrointestinal: Negative for abdominal pain, constipation and vomiting. Endocrine: Negative for cold intolerance and heat intolerance. Genitourinary:        As per HPI   Musculoskeletal: Negative for back pain, joint swelling and neck pain. Skin: Negative for color change and rash. Neurological: Negative for dizziness, weakness and numbness. Hematological: Does not bruise/bleed easily. Psychiatric/Behavioral: Negative. Objective:  /60 (BP Location: Left arm, Patient Position: Sitting, Cuff Size: Standard)   Ht 5' 7" (1.702 m)   Wt 55.8 kg (123 lb)   BMI 19.26 kg/m²        Physical Exam  Constitutional:       General: She is not in acute distress. Appearance: Normal appearance. Genitourinary:      Vulva and rectum normal.      No lesions in the vagina. Right Labia: No rash or lesions. Left Labia: No lesions or rash. Vaginal discharge (mod amt thicker slightly heterogeneous d'c. no malodor) present. No vaginal erythema, tenderness or bleeding. No vaginal atrophy present. Right Adnexa: not tender and no mass present. Left Adnexa: not tender and no mass present. No cervical motion tenderness, discharge or friability. Uterus is not enlarged or tender. No urethral prolapse present. Pelvic exam was performed with patient in the lithotomy position. Breasts:     Breasts are symmetrical.      Right: Breast implant present. No inverted nipple, mass, nipple discharge, skin change or tenderness. Left: Breast implant present. No inverted nipple, mass, nipple discharge, skin change or tenderness. HENT:      Head: Normocephalic and atraumatic. Cardiovascular:      Rate and Rhythm: Normal rate. Heart sounds: No murmur heard. Pulmonary:      Effort: Pulmonary effort is normal.      Breath sounds: Normal breath sounds. Abdominal:      General: There is no distension. Palpations: Abdomen is soft. Tenderness: There is no abdominal tenderness. Musculoskeletal:         General: Normal range of motion. Cervical back: Normal range of motion. Lymphadenopathy:      Cervical: No cervical adenopathy. Neurological:      Mental Status: She is alert and oriented to person, place, and time. Skin:     General: Skin is warm and dry.    Psychiatric:         Mood and Affect: Mood normal.         Behavior: Behavior normal.   Vitals reviewed. Assessment/Plan:       [address]- [facility]  Annual GYN examination completed today. Risk prevention and anticipatory guidance provided including:  • Risk for hereditary cancers: Family history reviewed and patient does not qualify for referral for genetics consult/testing. Referral to genetics oncology offered as indicated. , Colon Cancer Screening: She is overdue for screening, Has GI doc and will contact them to schedule  • Calcium and vitamin D supplementation. • Dietary and lifestyle recommendations based on her age and weight. body mass index is 19.26 kg/m². .    • Tobacco and alcohol use, intervention ordered if applicable. Cervical cancer screening  Previous pap smears and ASCCP screening guidelines have been reviewed. Pap smear is indicated at this time. Contraception   Currently on Mirena IUD. No signficant adverse effects. Desires to continue. Strings were visualized on exam    STI Screening  STI screening is declined. STI prevention discussed with use of condoms. Breast exam and breast cancer screening  Breast exam was done; , Reviewed recommendation for yearly screening mammogram (as per ACOG, ACS, and [address] Departments), however, also made her aware that there are other professional organizations that may recommend deferring mammograms until age 48 or screening every other year. CBE should still be done yearly, but may miss some cancers and alone is not as effective as breast imaging. Supplemental testing may also be indicated based on lifetime risk for breast cancer as done by [facility] imaging., She is overdue for screening; Order given today    Problem List Items Addressed This Visit     IUD (intrauterine device) in place    Urinary urgency     Pt w/ c/o urgency dysuria and hematuria  UA in office with small LE and +3 blood. Neg nitrites.   Will start empiric antibiotics and f/u with UC sent to lab         Relevant Medications    sulfamethoxazole-trimethoprim (BACTRIM DS) 800-160 mg per tablet    Other Relevant Orders    POCT wet mount (Completed)    POCT urine dip (Completed)    Urinalysis with microscopic    Urine culture   Other Visit Diagnoses     Encounter for annual routine gynecological examination    -  Primary    Encounter for screening for cervical cancer        Relevant Orders    Liquid-based pap, screening    Encounter for other screening for malignant neoplasm of breast        Relevant Orders    Mammo screening bilateral w 3d & cad    Acute cystitis with hematuria        Screen for STD (sexually transmitted disease)        Relevant Orders    Chlamydia/GC amplified DNA by PCR    Leukorrhea        wet mount negative            Orders Placed This Encounter   Procedures   • Chlamydia/GC amplified DNA by PCR   • Urine culture   • Mammo screening bilateral w 3d & cad   • Urinalysis with microscopic   • POCT wet mount   • POCT urine dip

## 2023-08-25 NOTE — PROGRESS NOTES
Patient is here today for a gynecological annual exam.   Patient is having burning and itchy to her vaginal area and blooding in her urine   LMP: mirena     BC:     Last pap: 2018 -/+     Hx of abnormal paps?  Yes   Last Mammo: 2021 neg - mammo ordered  Last Colonoscopy: Not on file   Breast cancer in maternal aunt

## 2023-08-25 NOTE — ASSESSMENT & PLAN NOTE
Pt w/ c/o urgency dysuria and hematuria  UA in office with small LE and +3 blood. Neg nitrites.   Will start empiric antibiotics and f/u with UC sent to lab

## 2023-08-26 LAB — BACTERIA UR CULT: ABNORMAL

## 2023-08-28 ENCOUNTER — TELEPHONE (OUTPATIENT)
Dept: OBGYN CLINIC | Facility: CLINIC | Age: 48
End: 2023-08-28

## 2023-08-28 LAB
HPV HR 12 DNA CVX QL NAA+PROBE: POSITIVE
HPV16 DNA CVX QL NAA+PROBE: NEGATIVE
HPV18 DNA CVX QL NAA+PROBE: NEGATIVE

## 2023-08-28 NOTE — RESULT ENCOUNTER NOTE
Abnormal UA as expected. UC with tiny amt bacteria  Was given abx at appt. Given other abnormals in UA- would complete Abx course and repeat UA in 2 wks.  If still + for blood will refer to urology

## 2023-08-28 NOTE — TELEPHONE ENCOUNTER
----- Message from Penelope Duverney, [address] [facility] sent at 8/28/2023 11:53 AM EDT -----  Abnormal UA as expected. UC with tiny amt bacteria  Was given abx at appt. Given other abnormals in UA- would complete Abx course and repeat UA in 2 wks.  If still + for blood will refer to urology

## 2023-08-29 LAB
C TRACH DNA SPEC QL NAA+PROBE: NEGATIVE
N GONORRHOEA DNA SPEC QL NAA+PROBE: NEGATIVE

## 2023-09-01 PROBLEM — A63.0 HPV (HUMAN PAPILLOMA VIRUS) ANOGENITAL INFECTION: Status: ACTIVE | Noted: 2023-09-01

## 2023-09-01 LAB
LAB AP GYN PRIMARY INTERPRETATION: NORMAL
Lab: NORMAL

## 2023-09-05 ENCOUNTER — TELEPHONE (OUTPATIENT)
Dept: OBGYN CLINIC | Facility: CLINIC | Age: 48
End: 2023-09-05

## 2023-09-05 NOTE — TELEPHONE ENCOUNTER
----- Message from Stanley Sage, [address] sent at 9/1/2023  5:05 PM EDT -----  Pap result is negative/normal  However the HPV result is showing + High Risk HPV-type OTHER  Since type is not 16/18 is not considered as high of a risk for being oncogenic and does not require any additional testing at present, but will require repeat testing in 1 year. Problem list and history updated.    Please notify patient

## 2023-09-05 NOTE — TELEPHONE ENCOUNTER
Reviewed with patient    Your pap was negative but did test positive for HPV. What this means is that at some point you was exposed to the HPV virus. This is very common. This is something that we will watch over time to make sure that your immune system fends it off and doesn't change the cervix. For this reason, we will repeat the Pap and HPV test in one year. There is nothing that needs to be done in the interim - it is something the body should take care of on its own.

## 2023-09-07 ENCOUNTER — TELEPHONE (OUTPATIENT)
Dept: FAMILY MEDICINE CLINIC | Facility: CLINIC | Age: 48
End: 2023-09-07

## 2023-09-07 ENCOUNTER — OFFICE VISIT (OUTPATIENT)
Dept: OBGYN CLINIC | Facility: CLINIC | Age: 48
End: 2023-09-07

## 2023-09-07 ENCOUNTER — TELEPHONE (OUTPATIENT)
Dept: OBGYN CLINIC | Facility: CLINIC | Age: 48
End: 2023-09-07

## 2023-09-07 ENCOUNTER — TELEPHONE (OUTPATIENT)
Dept: OBGYN CLINIC | Facility: MEDICAL CENTER | Age: 48
End: 2023-09-07

## 2023-09-07 VITALS — WEIGHT: 121.2 LBS | SYSTOLIC BLOOD PRESSURE: 124 MMHG | BODY MASS INDEX: 18.98 KG/M2 | DIASTOLIC BLOOD PRESSURE: 78 MMHG

## 2023-09-07 DIAGNOSIS — R39.15 URINARY URGENCY: Primary | ICD-10-CM

## 2023-09-07 LAB
BACTERIA UR QL AUTO: ABNORMAL /HPF
BILIRUB UR QL STRIP: NEGATIVE
CLARITY UR: CLEAR
COLOR UR: ABNORMAL
GLUCOSE UR STRIP-MCNC: NEGATIVE MG/DL
HGB UR QL STRIP.AUTO: NEGATIVE
KETONES UR STRIP-MCNC: NEGATIVE MG/DL
LEUKOCYTE ESTERASE UR QL STRIP: NEGATIVE
NITRITE UR QL STRIP: NEGATIVE
NON-SQ EPI CELLS URNS QL MICRO: ABNORMAL /HPF
PH UR STRIP.AUTO: 6.5 [PH]
PROT UR STRIP-MCNC: ABNORMAL MG/DL
RBC #/AREA URNS AUTO: ABNORMAL /HPF
SP GR UR STRIP.AUTO: 1.01 (ref 1–1.03)
UROBILINOGEN UR STRIP-ACNC: <2 MG/DL
WBC #/AREA URNS AUTO: ABNORMAL /HPF

## 2023-09-07 PROCEDURE — 87086 URINE CULTURE/COLONY COUNT: CPT | Performed by: PHYSICIAN ASSISTANT

## 2023-09-07 PROCEDURE — 81001 URINALYSIS AUTO W/SCOPE: CPT | Performed by: PHYSICIAN ASSISTANT

## 2023-09-07 RX ORDER — PHENAZOPYRIDINE HYDROCHLORIDE 200 MG/1
TABLET, FILM COATED ORAL
Qty: 20 TABLET | Refills: 0 | Status: SHIPPED | OUTPATIENT
Start: 2023-09-07

## 2023-09-07 NOTE — TELEPHONE ENCOUNTER
There is no risk this gets passed to her family members   Her body usually fights off this virus also   She would just need repeat testing in 1 yr   The boys dont need anything     They COULD get the hpv vaccine gardisil if  they have not.      But honestly they are fine

## 2023-09-07 NOTE — TELEPHONE ENCOUNTER
Patient had questions about being hpv positive - she wants to know if she contagious to her children ( can she kiss them) - do they need to be vaccinated - she doesn't understand the strain she has

## 2023-09-07 NOTE — TELEPHONE ENCOUNTER
Patient called in with concerns after her appt with Shanice palma in [place]. She was recently diagnosed with HPV and was told by Shanice that she has the higher strain, while Sylwia Huntley told her at her annual exam on 8/25 that she has the lower strain. She wants to be clear on which one she has as it seems there has been a miscommunication. She wants to know what she should do regarding her children as she lives with them and is always in close contact with them. Please advise.

## 2023-09-07 NOTE — PROGRESS NOTES
Rupal Mishra  1975    S:  56 y.o. female with c/o urinary urgency. Was seen for annual exam 8/25/23 and had noted urgency,frequency, dysuria, pelvic pressure, back pain and hematuria. UA 8/25/23 in office with small LE and +3 blood. Neg nitrites. Also noted vulvovaginal irritation. Had neg wet mount. She completed bactrim DS x 5 days. Urine culture ultimately returned showing <10,000 gram neg aris. Now continues with urgency, pelvic pressure, occasionally frequency, mild intermittent nausea, and fatigue. Saw ? 5-6 "sherry" yesterday with void. No hx of kidney stones. Denies F/C/S, abdominal pain, V, flank pain, hematuria, and dysuria. No vaginal discharge, itching, odor, or vulvar sx. Review of Systems   Constitutional: Negative for F/C/S. Respiratory: Negative. Cardiovascular: Negative. Gastrointestinal: Negative for abd pain, nausea, vomiting, and flank pain. Genitourinary: As above noted     O:  /78 (BP Location: Right arm, Patient Position: Sitting, Cuff Size: Standard)   Wt 55 kg (121 lb 3.2 oz)   BMI 18.98 kg/m²     She appears well and is in no distress  Normocephalic, atraumatic. Abdomen is soft and nontender. No CVA tenderness  External genitals are normal without lesions or rashes  Vagina is without discharge or bleeding. Cervix is without lesions or discharge. No CMT. Uterus is nontender, no masses  Adnexa are nontender, no pelvic masses appreciated  No focal neurological deficits. Normal mood, affect, and behavior. A/P:    1. Urinary urgency    - Urinalysis with microscopic  - Urine culture  - phenazopyridine (PYRIDIUM) 200 mg tablet; Take TID as needed for urinary symptoms. Take with food. Dispense: 20 tablet; Refill: 0    Given appearance of reported 5-6 stones in toilet yesterday, suspect sx are stemming from injury relating to stone passage.  Discussed other causes for sx including inflammation, infection, etc.   Will check UA/UC  Pyridium to help with discomfort sx. Adequate hydration and avoidance of bladder irritants. F/u pending results. Precautions for worsening/changing sx given.

## 2023-09-07 NOTE — TELEPHONE ENCOUNTER
Patient called and stated she just found out she was HPV positive and was worried and wanted to know what route she should do for her sons (ages 16 and 15)? She said because it's skin to skin contact she's panicking about it.  Please advise

## 2023-09-07 NOTE — PATIENT INSTRUCTIONS
Take ibuprofen for pain / inflammation  Begin pyridium - prescription sent  64 oz of water per day - AT LEAST    Will plan to follow up with culture results early next week -- call on Monday for results       [address]    The following selections show the most common foods which may worsen or irritate the bladder in patients with overactive bladder or interstitial cystitis. This does not mean that you need to avoid all of these foods, but you should pay close attention to how you feel after eating them. If you feel worse, then there is a good chance that this is a trigger food for you. Cranberry Juices and Extracts:   Cranberry juice may be the most frequent irritant in a patient’s diet. Recommended for consumption during urinary tract infections, cranberry juice can be very difficult for an overactive bladder to tolerate. If you don’t wish to give it up entirely, try diluting it with water by half or more. Coffee and Tea Products: In a sensitive bladder, coffee is believed to be the top bladder irritant. Some people can tolerate low acid coffees, while others try teas. For the most sensitive patient, the best option for a hot drink is hot water with honey. Carbonated beverages an sodas of any type (diet and regular): The most difficult soda to tolerate appears to be diet cola, which may consist of carbonation, caffeine, aspartame, and cocoa derivatives, four known bladder irritants. If you must have soda, try a clear, non-diet soda like Sprite. Tomatoes, Tofu, and some Beans:   Red tomatoes can be very acidic. Some patients can tolerate pizza and tomato sauces for pasta, while others cannot. Low acid yellow tomatoes may be good substitutes in pasta and salads. Sammy, black, lima and soy beans are also potential irritants, due to their high acid content.     Herbal teas:   Patients with overactive bladder can be sensitive to herb teas, particularly those that have many ingredients. If you cannot avoid herbal tea, try experimenting with one or two ingredients at a time, so you can tell if a particular herb bothers you. Tobacco.    Alcohol and Vinegars:   Wine, beer, champagne and even wine sauces could be irritants due to their alcohol content. Some patients find white makayla more tolerable than red makayla (due to their reduced histamine content). Chocolate  Chocolate is considered one of the worst irritants for the body. Strawberries and Acidic Fruits  Lemon, orange, grapefruit, pineapple, strawberries and plums have been known to inflame sensitive bladders. Fruit juices seem to be particularly difficult for some patients to tolerate because each can of juice may contain more acid than contained in one piece of fruit. Try low acid fruit juice, or diluted juice. Food Additives and Seasonings  Food additives are known to cause chemical sensitivities and allergic reactions in a small percentage of the general public, particularly monosodium glutamate (MSG), nitrates, and butylated hydroxytolene (BHT). Monosodium Glutamate (MSG) is frequently found in prepared foods and mixes. Nitrates, found in prepared meats, have long been suspected as bladder irritants. Butylated hydroxytolene (BHT), is commonly found in cereals and breads. A careful review of ingredient labels will help identify foods that should be limited or eliminated from your diet.

## 2023-09-09 LAB — BACTERIA UR CULT: NORMAL

## 2023-11-16 ENCOUNTER — OFFICE VISIT (OUTPATIENT)
Dept: FAMILY MEDICINE CLINIC | Facility: CLINIC | Age: 48
End: 2023-11-16
Payer: COMMERCIAL

## 2023-11-16 VITALS
WEIGHT: 127 LBS | BODY MASS INDEX: 19.93 KG/M2 | OXYGEN SATURATION: 99 % | HEIGHT: 67 IN | DIASTOLIC BLOOD PRESSURE: 70 MMHG | SYSTOLIC BLOOD PRESSURE: 122 MMHG | HEART RATE: 67 BPM

## 2023-11-16 DIAGNOSIS — R61 HYPERHIDROSIS: ICD-10-CM

## 2023-11-16 DIAGNOSIS — F41.9 ANXIETY: Primary | ICD-10-CM

## 2023-11-16 DIAGNOSIS — Z00.00 WELL ADULT EXAM: ICD-10-CM

## 2023-11-16 DIAGNOSIS — E61.1 IRON DEFICIENCY: ICD-10-CM

## 2023-11-16 DIAGNOSIS — E53.8 B12 DEFICIENCY: ICD-10-CM

## 2023-11-16 DIAGNOSIS — A63.0 HPV (HUMAN PAPILLOMA VIRUS) ANOGENITAL INFECTION: ICD-10-CM

## 2023-11-16 DIAGNOSIS — K51.90 ULCERATIVE COLITIS WITHOUT COMPLICATIONS, UNSPECIFIED LOCATION (HCC): ICD-10-CM

## 2023-11-16 PROCEDURE — 99396 PREV VISIT EST AGE 40-64: CPT | Performed by: FAMILY MEDICINE

## 2023-11-16 PROCEDURE — 99214 OFFICE O/P EST MOD 30 MIN: CPT | Performed by: FAMILY MEDICINE

## 2023-11-16 RX ORDER — HYDROXYZINE HYDROCHLORIDE 10 MG/1
TABLET, FILM COATED ORAL
Qty: 30 TABLET | Refills: 0 | Status: SHIPPED | OUTPATIENT
Start: 2023-11-16

## 2023-11-16 RX ORDER — TOBRAMYCIN AND DEXAMETHASONE 3; 1 MG/ML; MG/ML
SUSPENSION/ DROPS OPHTHALMIC
COMMUNITY
Start: 2023-11-05

## 2023-11-16 NOTE — PROGRESS NOTES
Assessment/Plan:  1. Anxiety  Assessment & Plan:  We discussed about hydroxyzine, propranolol, and clonidine, which are non-addictive and non-controlled substance antianxiety ones. We also discussed propranolol. I will prescribe hydroxyzine 10 mg 1 tablet as needed. Orders:  -     hydrOXYzine HCL (ATARAX) 10 mg tablet; Take 1-2 tablet as needed for anxiety and panic    2. Hyperhidrosis  -     aluminum chloride (DRYSOL) 20 % external solution; Apply topically daily at bedtime    3. Well adult exam  -     CBC and differential; Future  -     Comprehensive metabolic panel; Future  -     Lipid Panel with Direct LDL reflex; Future    4. Iron deficiency  -     Iron Panel (Includes Ferritin, Iron Sat%, Iron, and TIBC); Future    5. B12 deficiency  -     Vitamin B12; Future    6. Ulcerative colitis without complications, unspecified location ([address])    7. HPV (human papilloma virus) anogenital infection      She will follow up in 1 year. Instructed her that if she has any issues or concerns, she can call the office. Subjective:      Patient ID: Santiago Khan is a 53 y.o. female who presents today for annual physical exam.  She consents to the use of EMILY. Her ulcerative colitis is diet controlled, and she does not take any medications, injections, or infusions. She has been in remission for over 5 years. She is scheduled for a colonoscopy next year with Dr. Corby Ahuja.  Her mammogram is scheduled for 01/2024. She is moving and has started a new job which is a high stress job. She is experiencing a significant degree of anxiety. She is trying to get under control with working out, but there are times where she feels like she cannot breathe. She is in a high stress job. She feels like she cannot concentrate. Years ago, she was on Xanax, but she does not want something where she is on it every day. She would like something as needed. She is going to try yoga.     She has an indentation above her belly button. She denies any protrusion. Her father had a hiatal hernia. She has sweating underneath her arms. She thinks it is probably stress related. The following portions of the patient's history were reviewed and updated as appropriate: allergies, current medications, past family history, past medical history, past social history, past surgical history and problem list.    Review of Systems   Constitutional: Negative for fever and unexpected weight change. HENT: Negative for nosebleeds and trouble swallowing. Eyes: Negative for visual disturbance. Respiratory: Negative for chest tightness and shortness of breath. Cardiovascular: Negative for chest pain, palpitations and leg swelling. Gastrointestinal: Negative for abdominal pain, constipation, diarrhea and nausea. Endocrine: Negative for cold intolerance. Genitourinary: Negative for dysuria and urgency. Musculoskeletal: Negative for joint swelling and myalgias. Skin: Negative for rash. Neurological: Negative for tremors, seizures and syncope. Hematological: Does not bruise/bleed easily. Psychiatric/Behavioral: Negative for hallucinations and suicidal ideas. Objective:     /70   Pulse 67   Ht 5' 7" (1.702 m)   Wt 57.6 kg (127 lb)   SpO2 99%   BMI 19.89 kg/m²    Physical Exam  Vitals and nursing note reviewed. Constitutional:     Appearance: Well-developed. HENT:     Head: Normocephalic and atraumatic. Cardiovascular:     Rate and Rhythm: Normal rate and regular rhythm. Heart sounds: Normal heart sounds. No murmur heard. Pulmonary:     Effort: Pulmonary effort is normal.     Breath sounds: Normal breath sounds. No wheezing or rales. Abdominal:     General: Bowel sounds are normal. There is no distension. Palpations: Abdomen is soft. Tenderness: There is no abdominal tenderness. Musculoskeletal:     General: No tenderness. Normal range of motion.      Cervical back: Normal range of motion and neck supple. Lymphadenopathy:     Cervical: No cervical adenopathy. Skin:     General: Skin is warm and dry. Capillary Refill: Capillary refill takes less than 2 seconds. Findings: No rash. Neurological:     Mental Status: Alert and oriented to person, place, and time. Cranial Nerves: No cranial nerve deficit. Sensory: No sensory deficit. Motor: No abnormal muscle tone. Psychiatric:     Behavior: Behavior normal.     Thought Content: Thought content normal.     Judgment: Judgment normal.      No visits with results within 2 Week(s) from this visit. Latest known visit with results is:   Office Visit on 09/07/2023   Component Date Value   • Color, UA 09/07/2023 Light Yellow    • Clarity, UA 09/07/2023 Clear    • Specific Gravity, UA 09/07/2023 1.014    • pH, UA 09/07/2023 6.5    • Leukocytes, UA 09/07/2023 Negative    • Nitrite, UA 09/07/2023 Negative    • Protein, UA 09/07/2023 Trace (A)    • Glucose, UA 09/07/2023 Negative    • Ketones, UA 09/07/2023 Negative    • Urobilinogen, UA 09/07/2023 <2.0    • Bilirubin, UA 09/07/2023 Negative    • Occult Blood, UA 09/07/2023 Negative    • RBC, UA 09/07/2023 1-2    • WBC, UA 09/07/2023 1-2    • Epithelial Cells 09/07/2023 Occasional    • Bacteria, UA 09/07/2023 None Seen    • Urine Culture 09/07/2023 10,000-19,000 cfu/ml      I have personally reviewed results with the patient. Melia Bynum MD   [address]     Transcribed for Melia Bynum MD, by Suzy Cage on 11/17/23 at 2:58 PM. Powered by [employer].

## 2023-11-17 PROBLEM — R39.15 URINARY URGENCY: Status: RESOLVED | Noted: 2023-08-25 | Resolved: 2023-11-17

## 2023-11-17 NOTE — ASSESSMENT & PLAN NOTE
We discussed about hydroxyzine, propranolol, and clonidine, which are non-addictive and non-controlled substance antianxiety ones. We also discussed propranolol. I will prescribe hydroxyzine 10 mg 1 tablet as needed.

## 2024-01-02 ENCOUNTER — HOSPITAL ENCOUNTER (OUTPATIENT)
Age: 49
Discharge: HOME | End: 2024-01-02
Payer: COMMERCIAL

## 2024-01-02 DIAGNOSIS — Z12.39 ENCOUNTER FOR OTHER SCREENING FOR MALIGNANT NEOPLASM OF BREAST: ICD-10-CM

## 2024-01-02 PROCEDURE — 77067 SCR MAMMO BI INCL CAD: CPT

## 2024-01-02 PROCEDURE — 77063 BREAST TOMOSYNTHESIS BI: CPT

## 2024-02-21 PROBLEM — Z01.419 ENCOUNTER FOR GYNECOLOGICAL EXAMINATION (GENERAL) (ROUTINE) WITHOUT ABNORMAL FINDINGS: Status: RESOLVED | Noted: 2018-01-30 | Resolved: 2024-02-21

## 2024-03-01 DIAGNOSIS — R61 HYPERHIDROSIS: ICD-10-CM

## 2024-03-01 NOTE — TELEPHONE ENCOUNTER
Reason for call:   [x] Refill   [] Prior Auth  [] Other:     Office:   [x] PCP/Provider - Alcides Hong   [] Specialty/Provider -     Medication: aluminum chloride (DRYSOL)     Dose/Frequency: 20 % external solution    Quantity: 35 mL    Pharmacy: [facility]/pharmacy [address] [place] [address]    Does the patient have enough for 3 days?   [] Yes   [x] No - Send as HP to POD

## 2024-03-07 ENCOUNTER — TELEPHONE (OUTPATIENT)
Age: 49
End: 2024-03-07

## 2024-03-07 NOTE — TELEPHONE ENCOUNTER
Patients GI provider:  Dr. Purcell    Number to return call: [phone]    Reason for call: Pt calling requesting  recommendation on a peds gastro Dr for her son.     Scheduled procedure/appointment date if applicable: N/A

## 2024-08-27 ENCOUNTER — TELEPHONE (OUTPATIENT)
Dept: OTHER | Facility: OTHER | Age: 49
End: 2024-08-27

## 2025-01-06 ENCOUNTER — OFFICE VISIT (OUTPATIENT)
Dept: OBGYN CLINIC | Facility: MEDICAL CENTER | Age: 50
End: 2025-01-06
Payer: COMMERCIAL

## 2025-01-06 VITALS
BODY MASS INDEX: 20.72 KG/M2 | WEIGHT: 132 LBS | SYSTOLIC BLOOD PRESSURE: 126 MMHG | DIASTOLIC BLOOD PRESSURE: 74 MMHG | HEIGHT: 67 IN

## 2025-01-06 DIAGNOSIS — Z97.5 IUD (INTRAUTERINE DEVICE) IN PLACE: ICD-10-CM

## 2025-01-06 DIAGNOSIS — Z11.3 SCREEN FOR STD (SEXUALLY TRANSMITTED DISEASE): ICD-10-CM

## 2025-01-06 DIAGNOSIS — R92.343 EXTREMELY DENSE TISSUE OF BOTH BREASTS ON MAMMOGRAPHY: ICD-10-CM

## 2025-01-06 DIAGNOSIS — Z12.11 SCREEN FOR COLON CANCER: ICD-10-CM

## 2025-01-06 DIAGNOSIS — Z12.31 ENCOUNTER FOR SCREENING MAMMOGRAM FOR MALIGNANT NEOPLASM OF BREAST: ICD-10-CM

## 2025-01-06 DIAGNOSIS — Z12.39 BREAST CANCER SCREENING OTHER THAN MAMMOGRAM: ICD-10-CM

## 2025-01-06 DIAGNOSIS — A63.0 HPV (HUMAN PAPILLOMA VIRUS) ANOGENITAL INFECTION: ICD-10-CM

## 2025-01-06 DIAGNOSIS — Z12.4 CERVICAL CANCER SCREENING: ICD-10-CM

## 2025-01-06 DIAGNOSIS — Z01.419 ENCOUNTER FOR GYNECOLOGICAL EXAMINATION (GENERAL) (ROUTINE) WITHOUT ABNORMAL FINDINGS: Primary | ICD-10-CM

## 2025-01-06 PROCEDURE — G0145 SCR C/V CYTO,THINLAYER,RESCR: HCPCS | Performed by: CLINICAL NURSE SPECIALIST

## 2025-01-06 PROCEDURE — 87491 CHLMYD TRACH DNA AMP PROBE: CPT | Performed by: CLINICAL NURSE SPECIALIST

## 2025-01-06 PROCEDURE — 87591 N.GONORRHOEAE DNA AMP PROB: CPT | Performed by: CLINICAL NURSE SPECIALIST

## 2025-01-06 PROCEDURE — G0476 HPV COMBO ASSAY CA SCREEN: HCPCS | Performed by: CLINICAL NURSE SPECIALIST

## 2025-01-06 PROCEDURE — 99396 PREV VISIT EST AGE 40-64: CPT | Performed by: CLINICAL NURSE SPECIALIST

## 2025-01-06 NOTE — PROGRESS NOTES
"Name: Maximilian San      : 1975      MRN: [id number]  Encounter Provider: JAMES Corcoran  Encounter Date: 2025   Encounter department: [facility] [place]    :  Assessment & Plan  Encounter for gynecological examination (general) (routine) without abnormal findings  Annual GYN examination completed today.   Health maintenance reviewed/updated as appropriate  Risk prevention and anticipatory guidance provided including:  Encouraged regular self breast exams and to call with changes   Age related Calcium and vitamin D intake  Dietary and lifestyle recommendations based on her age and weight. body mass index is 20.67 kg/m²..    Tobacco and alcohol use, intervention ordered if applicable.   Condom use for prevention of STI's.       HPV (human papilloma virus) anogenital infection  Pap collected   Orders:  •  Liquid-based pap, screening    Cervical cancer screening  Pap collected   Orders:  •  Liquid-based pap, screening    Screen for STD (sexually transmitted disease)    Orders:  •  Chlamydia/GC amplified DNA by PCR  •  RPR-Syphilis Screening (Total Syphilis IGG/IGM); Future  •  HIV 1/2 AG/AB w Reflex [facility] for 2 yr old and above; Future  •  Hepatitis B surface antigen; Future  •  Hepatitis C antibody; Future    Encounter for screening mammogram for malignant neoplasm of breast    Orders:  •  Mammo screening bilateral w 3d and cad; Future    Screen for colon cancer  Has gi doc. Aware she is overdue to colonscopy. declined referral. she will call to schedule appt        IUD (intrauterine device) in place  Strings seen on Exam. Approx 2 cm.   Pt w/o complaints.   Device inserted 2021- good till        Extremely dense tissue of both breasts on mammography  Discussed her breast density rating  \"extremely dense\"  and recommended to have adjunctive screening with breast MRI or ABUS to supplement Mammo. ABUS ordered. Encouraged pt to check with " insurance.    Orders:  •  US breast screening bilateral complete (ABUS); Future    Breast cancer screening other than mammogram    Orders:  •  US breast screening bilateral complete (ABUS); Future                Subjective:      History of Present Illness     Maximilian San is a 49 y.o. female. Here for Gynecologic Exam (Pap 8/25/23 abnormal /Mammo 1/2/24 bi-rads 2 /Birth control mirena/Colonoscopy /Std testing)      Patient denies menstrual complaints. Regular monthly menses, not excessive barely gets menses due to   She denies any C/O abnormal vaginal discharge or irritation. Denies Urinary complaints or breast changes    Sexually active: yes  Monogamous/single partner  Denies C/O related to intimacy/sexual activity  Contraception: Mirena IUD. No c/o - inserted 2021     She reports she feels safe at home.       HEALTH MAINTENANCE SCREENINGS:     Previous pap smears and ASCCP screening guidelines have been reviewed.   Last Pap:  08/25/2023; Next due now  History of abnormal pap: Yes, HPV type other 1/2024,   Last mammogram:  01/02/2024 BR-2d hx of lumpectomy and now has implants . Stable nodule left breast   Last Colon Cancer Screening: colonoscopy: Not on file Cologuard:Not on file. Recall overdue    Hereditary Cancer Screening  FH Breast/Ovarian cancer: Denies  FH Uterine cancer: denies  FH Colon cancer: denies     Substance Abuse Screening Completed. See hx and flowsheet.  Review of Systems   Constitutional:  Negative for appetite change, chills, fatigue, fever and unexpected weight change.   HENT: Negative.     Eyes: Negative.    Respiratory:  Negative for chest tightness and shortness of breath.    Cardiovascular:  Negative for chest pain and palpitations.   Gastrointestinal:  Negative for abdominal pain, constipation and vomiting.   Endocrine: Negative for cold intolerance and heat intolerance.   Genitourinary:         As per HPI   Musculoskeletal:  Negative for back pain, joint swelling and neck pain.  "  Skin:  Negative for color change and rash.   Neurological:  Negative for dizziness, weakness and numbness.   Hematological:  Does not bruise/bleed easily.   Psychiatric/Behavioral: Negative.       The following portions of the patient's history were reviewed and updated as appropriate: allergies, current medications, past family history, past medical history, past social history, past surgical history, and problem list.         Objective   /74 (BP Location: Left arm, Patient Position: Sitting, Cuff Size: Standard)   Ht 5' 7\" (1.702 m)   Wt 59.9 kg (132 lb)   BMI 20.67 kg/m²     Physical Exam  Constitutional:       General: She is not in acute distress.     Appearance: Normal appearance.   Genitourinary:      Vulva and rectum normal.      No lesions in the vagina.      Right Labia: No rash or lesions.     Left Labia: No lesions or rash.     No vaginal discharge, erythema, tenderness or bleeding.        Right Adnexa: not tender and no mass present.     Left Adnexa: not tender and no mass present.     No cervical motion tenderness, discharge or friability.      Uterus is not enlarged or tender.      No urethral prolapse present.      Pelvic exam was performed with patient in the lithotomy position.   Breasts:     Breasts are symmetrical.      Right: No inverted nipple, mass, nipple discharge, skin change or tenderness.      Left: No inverted nipple, mass, nipple discharge, skin change or tenderness.   HENT:      Head: Normocephalic and atraumatic.   Cardiovascular:      Rate and Rhythm: Normal rate.      Heart sounds: No murmur heard.  Pulmonary:      Effort: Pulmonary effort is normal.      Breath sounds: Normal breath sounds.   Abdominal:      General: There is no distension.      Palpations: Abdomen is soft.      Tenderness: There is no abdominal tenderness.   Musculoskeletal:         General: Normal range of motion.      Cervical back: Normal range of motion.   Lymphadenopathy:      Cervical: No cervical " adenopathy.   Neurological:      Mental Status: She is alert and oriented to person, place, and time.   Skin:     General: Skin is warm and dry.   Psychiatric:         Mood and Affect: Mood normal.         Behavior: Behavior normal.   Vitals reviewed.

## 2025-01-06 NOTE — PROGRESS NOTES
"Name: Maximilian San      : 1975      MRN: [id number]  Encounter Provider: JAMES Corcoran  Encounter Date: 2025   Encounter department: [facility] [place]    :  Assessment & Plan  HPV (human papilloma virus) anogenital infection         Screen for STD (sexually transmitted disease)               {Administrative / Billing Section (Optional):48134}         Subjective:   History of Present Illness {?Quick Links Encounters * My Last Note * Last Note in Specialty * Snapshot * Since Last Visit * History :82708}    Maximilian San is a 49 y.o. female.She is here for No chief complaint on file.    Pt here for HPV testing.  Previous pap 2023 was NILM- But HPV + \"other\" type- non 16/18.    Menstrual History:  No LMP recorded. Patient has had an implant.          Review of Systems  The following portions of the patient's history were reviewed and updated as appropriate: allergies, current medications, past family history, past medical history, past social history, past surgical history, and problem list.          Objective:   Objective {?Quick Links Trend Vitals * Enter New Vitals * Results Review * Timeline (Adult) * Labs * Imaging * Cardiology * Procedures * Lung Cancer Screening * Surgical eConsent :92597}  There were no vitals taken for this visit.    OBGyn Exam        "

## 2025-01-06 NOTE — ASSESSMENT & PLAN NOTE
Strings seen on Exam. Approx 2 cm.   Pt w/o complaints.   Device inserted 1/2021- good till 2029

## 2025-01-06 NOTE — PATIENT INSTRUCTIONS
I ordered an ABUS to be done with your mammogram b/c you have dense breasts. Please check with insurance to see if covered  Automated breast Ultrasound bilateral  -diagnosis codes   -Breast cancer screening other than mammogram Z12.39   -Dense breast tissue R92.343 (Extremely Dense)    Please call [facility] SCHEDULING to set up your study at [phone] or through [employer]

## 2025-01-07 LAB
HPV HR 12 DNA CVX QL NAA+PROBE: NEGATIVE
HPV16 DNA CVX QL NAA+PROBE: NEGATIVE
HPV18 DNA CVX QL NAA+PROBE: NEGATIVE

## 2025-01-08 ENCOUNTER — RESULTS FOLLOW-UP (OUTPATIENT)
Dept: OBGYN CLINIC | Facility: MEDICAL CENTER | Age: 50
End: 2025-01-08

## 2025-01-08 LAB
C TRACH DNA SPEC QL NAA+PROBE: NEGATIVE
N GONORRHOEA DNA SPEC QL NAA+PROBE: NEGATIVE

## 2025-01-10 LAB
LAB AP GYN PRIMARY INTERPRETATION: NORMAL
Lab: NORMAL